# Patient Record
Sex: MALE | Race: WHITE | NOT HISPANIC OR LATINO | Employment: OTHER | URBAN - METROPOLITAN AREA
[De-identification: names, ages, dates, MRNs, and addresses within clinical notes are randomized per-mention and may not be internally consistent; named-entity substitution may affect disease eponyms.]

---

## 2023-04-20 DIAGNOSIS — E13.69 OTHER SPECIFIED DIABETES MELLITUS WITH OTHER SPECIFIED COMPLICATION, UNSPECIFIED WHETHER LONG TERM INSULIN USE (MULTI): ICD-10-CM

## 2023-04-20 RX ORDER — METOPROLOL SUCCINATE 50 MG/1
50 TABLET, EXTENDED RELEASE ORAL DAILY
Qty: 90 TABLET | Refills: 0 | Status: SHIPPED | OUTPATIENT
Start: 2023-04-20 | End: 2023-06-21

## 2023-04-20 RX ORDER — METOPROLOL SUCCINATE 50 MG/1
TABLET, EXTENDED RELEASE ORAL
COMMUNITY
End: 2023-04-20 | Stop reason: SDUPTHER

## 2023-06-07 PROBLEM — I10 BENIGN ESSENTIAL HYPERTENSION: Status: ACTIVE | Noted: 2023-06-07

## 2023-06-07 PROBLEM — I65.29 ARTERIOSCLEROSIS OF CAROTID ARTERY: Status: ACTIVE | Noted: 2023-06-07

## 2023-06-07 PROBLEM — N18.9 CKD (CHRONIC KIDNEY DISEASE): Status: ACTIVE | Noted: 2023-06-07

## 2023-06-07 PROBLEM — I83.891: Status: ACTIVE | Noted: 2023-06-07

## 2023-06-07 PROBLEM — E78.00 PURE HYPERCHOLESTEROLEMIA: Status: ACTIVE | Noted: 2023-06-07

## 2023-06-07 PROBLEM — Z86.73 H/O: CVA (CEREBROVASCULAR ACCIDENT): Status: ACTIVE | Noted: 2023-06-07

## 2023-06-07 PROBLEM — R89.9 ABNORMAL LABORATORY TEST RESULT: Status: ACTIVE | Noted: 2023-06-07

## 2023-06-07 PROBLEM — H53.9 VISUAL CHANGES: Status: ACTIVE | Noted: 2023-06-07

## 2023-06-07 PROBLEM — R93.1 ABNORMAL ECHOCARDIOGRAM: Status: ACTIVE | Noted: 2023-06-07

## 2023-06-07 PROBLEM — G45.0: Status: ACTIVE | Noted: 2023-06-07

## 2023-06-07 PROBLEM — Z95.2 S/P TAVR (TRANSCATHETER AORTIC VALVE REPLACEMENT): Status: ACTIVE | Noted: 2023-06-07

## 2023-06-07 PROBLEM — R26.81 GAIT INSTABILITY: Status: ACTIVE | Noted: 2023-06-07

## 2023-06-07 PROBLEM — M21.379 FOOT-DROP: Status: ACTIVE | Noted: 2023-06-07

## 2023-06-07 PROBLEM — Z95.1 S/P CABG (CORONARY ARTERY BYPASS GRAFT): Status: ACTIVE | Noted: 2023-06-07

## 2023-06-07 PROBLEM — R01.1 MURMUR: Status: ACTIVE | Noted: 2023-06-07

## 2023-06-07 PROBLEM — K21.9 GERD (GASTROESOPHAGEAL REFLUX DISEASE): Status: ACTIVE | Noted: 2023-06-07

## 2023-06-07 PROBLEM — I35.0 AORTIC STENOSIS: Status: ACTIVE | Noted: 2023-06-07

## 2023-06-07 PROBLEM — H91.90 HEARING LOSS: Status: ACTIVE | Noted: 2023-06-07

## 2023-06-07 PROBLEM — I25.10 3-VESSEL CORONARY ARTERY DISEASE: Status: ACTIVE | Noted: 2023-06-07

## 2023-06-07 PROBLEM — H26.9 CATARACT: Status: ACTIVE | Noted: 2023-06-07

## 2023-06-07 RX ORDER — NIACIN (INOSITOL NIACINATE) 400(500MG)
1 CAPSULE ORAL DAILY
COMMUNITY

## 2023-06-07 RX ORDER — ASPIRIN 81 MG/1
81 TABLET ORAL DAILY
COMMUNITY
Start: 2016-08-24

## 2023-06-07 RX ORDER — LANOLIN ALCOHOL/MO/W.PET/CERES
1000 CREAM (GRAM) TOPICAL DAILY
COMMUNITY

## 2023-06-07 RX ORDER — SIMVASTATIN 40 MG/1
TABLET, FILM COATED ORAL
COMMUNITY
Start: 2013-08-15 | End: 2024-01-05 | Stop reason: SDUPTHER

## 2023-06-07 RX ORDER — ACETAMINOPHEN 500 MG
1 TABLET ORAL DAILY
COMMUNITY

## 2023-06-07 RX ORDER — POLYETHYLENE GLYCOL 3350 17 G/17G
17 POWDER, FOR SOLUTION ORAL ONCE
COMMUNITY
Start: 2021-04-02

## 2023-06-19 ENCOUNTER — OFFICE VISIT (OUTPATIENT)
Dept: PRIMARY CARE | Facility: CLINIC | Age: 88
End: 2023-06-19
Payer: MEDICARE

## 2023-06-19 VITALS
HEIGHT: 66 IN | BODY MASS INDEX: 27.48 KG/M2 | DIASTOLIC BLOOD PRESSURE: 68 MMHG | WEIGHT: 171 LBS | SYSTOLIC BLOOD PRESSURE: 157 MMHG

## 2023-06-19 DIAGNOSIS — Z00.00 ROUTINE GENERAL MEDICAL EXAMINATION AT HEALTH CARE FACILITY: ICD-10-CM

## 2023-06-19 DIAGNOSIS — N18.31 STAGE 3A CHRONIC KIDNEY DISEASE (MULTI): ICD-10-CM

## 2023-06-19 DIAGNOSIS — R26.81 GAIT INSTABILITY: ICD-10-CM

## 2023-06-19 DIAGNOSIS — I10 BENIGN ESSENTIAL HYPERTENSION: ICD-10-CM

## 2023-06-19 DIAGNOSIS — Z95.2 S/P TAVR (TRANSCATHETER AORTIC VALVE REPLACEMENT): ICD-10-CM

## 2023-06-19 DIAGNOSIS — Z13.89 SCREENING FOR MULTIPLE CONDITIONS: ICD-10-CM

## 2023-06-19 DIAGNOSIS — Z95.1 S/P CABG (CORONARY ARTERY BYPASS GRAFT): ICD-10-CM

## 2023-06-19 DIAGNOSIS — I65.23 ARTERIOSCLEROSIS OF BOTH CAROTID ARTERIES: Primary | ICD-10-CM

## 2023-06-19 DIAGNOSIS — Z71.89 CARDIAC RISK COUNSELING: ICD-10-CM

## 2023-06-19 LAB
ALANINE AMINOTRANSFERASE (SGPT) (U/L) IN SER/PLAS: 6 U/L (ref 10–52)
ALBUMIN (G/DL) IN SER/PLAS: 4 G/DL (ref 3.4–5)
ALKALINE PHOSPHATASE (U/L) IN SER/PLAS: 77 U/L (ref 33–136)
ANION GAP IN SER/PLAS: 13 MMOL/L (ref 10–20)
ASPARTATE AMINOTRANSFERASE (SGOT) (U/L) IN SER/PLAS: 13 U/L (ref 9–39)
BILIRUBIN TOTAL (MG/DL) IN SER/PLAS: 0.6 MG/DL (ref 0–1.2)
CALCIUM (MG/DL) IN SER/PLAS: 10 MG/DL (ref 8.6–10.6)
CARBON DIOXIDE, TOTAL (MMOL/L) IN SER/PLAS: 25 MMOL/L (ref 21–32)
CHLORIDE (MMOL/L) IN SER/PLAS: 108 MMOL/L (ref 98–107)
CHOLESTEROL (MG/DL) IN SER/PLAS: 144 MG/DL (ref 0–199)
CHOLESTEROL IN HDL (MG/DL) IN SER/PLAS: 46.4 MG/DL
CHOLESTEROL/HDL RATIO: 3.1
CREATININE (MG/DL) IN SER/PLAS: 2.74 MG/DL (ref 0.5–1.3)
GFR MALE: 21 ML/MIN/1.73M2
GLUCOSE (MG/DL) IN SER/PLAS: 86 MG/DL (ref 74–99)
LDL: 83 MG/DL (ref 0–99)
POTASSIUM (MMOL/L) IN SER/PLAS: 4.7 MMOL/L (ref 3.5–5.3)
PROTEIN TOTAL: 6.7 G/DL (ref 6.4–8.2)
SODIUM (MMOL/L) IN SER/PLAS: 141 MMOL/L (ref 136–145)
TRIGLYCERIDE (MG/DL) IN SER/PLAS: 74 MG/DL (ref 0–149)
UREA NITROGEN (MG/DL) IN SER/PLAS: 50 MG/DL (ref 6–23)
VLDL: 15 MG/DL (ref 0–40)

## 2023-06-19 PROCEDURE — 80061 LIPID PANEL: CPT

## 2023-06-19 PROCEDURE — 99214 OFFICE O/P EST MOD 30 MIN: CPT | Performed by: FAMILY MEDICINE

## 2023-06-19 PROCEDURE — 3078F DIAST BP <80 MM HG: CPT | Performed by: FAMILY MEDICINE

## 2023-06-19 PROCEDURE — 3077F SYST BP >= 140 MM HG: CPT | Performed by: FAMILY MEDICINE

## 2023-06-19 PROCEDURE — G0180 MD CERTIFICATION HHA PATIENT: HCPCS | Performed by: FAMILY MEDICINE

## 2023-06-19 PROCEDURE — 1170F FXNL STATUS ASSESSED: CPT | Performed by: FAMILY MEDICINE

## 2023-06-19 PROCEDURE — 1160F RVW MEDS BY RX/DR IN RCRD: CPT | Performed by: FAMILY MEDICINE

## 2023-06-19 PROCEDURE — 1036F TOBACCO NON-USER: CPT | Performed by: FAMILY MEDICINE

## 2023-06-19 PROCEDURE — G0444 DEPRESSION SCREEN ANNUAL: HCPCS | Performed by: FAMILY MEDICINE

## 2023-06-19 PROCEDURE — 1159F MED LIST DOCD IN RCRD: CPT | Performed by: FAMILY MEDICINE

## 2023-06-19 PROCEDURE — 80053 COMPREHEN METABOLIC PANEL: CPT

## 2023-06-19 PROCEDURE — G0439 PPPS, SUBSEQ VISIT: HCPCS | Performed by: FAMILY MEDICINE

## 2023-06-19 PROCEDURE — 1157F ADVNC CARE PLAN IN RCRD: CPT | Performed by: FAMILY MEDICINE

## 2023-06-19 RX ORDER — CLOPIDOGREL BISULFATE 75 MG/1
TABLET ORAL
COMMUNITY
End: 2023-12-18 | Stop reason: ALTCHOICE

## 2023-06-19 RX ORDER — FAMOTIDINE 20 MG/1
1 TABLET, FILM COATED ORAL DAILY
COMMUNITY

## 2023-06-19 RX ORDER — AMLODIPINE BESYLATE 5 MG/1
5 TABLET ORAL DAILY
Qty: 90 TABLET | Refills: 1 | Status: SHIPPED | OUTPATIENT
Start: 2023-06-19 | End: 2023-07-27 | Stop reason: SDUPTHER

## 2023-06-19 RX ORDER — METOPROLOL TARTRATE 25 MG/1
TABLET, FILM COATED ORAL
COMMUNITY
End: 2023-12-18 | Stop reason: ALTCHOICE

## 2023-06-19 RX ORDER — PREDNISOLONE ACETATE 10 MG/ML
SUSPENSION/ DROPS OPHTHALMIC
COMMUNITY
End: 2023-06-19 | Stop reason: ALTCHOICE

## 2023-06-19 ASSESSMENT — ENCOUNTER SYMPTOMS
LOSS OF SENSATION IN FEET: 0
DEPRESSION: 0
OCCASIONAL FEELINGS OF UNSTEADINESS: 1

## 2023-06-19 ASSESSMENT — ACTIVITIES OF DAILY LIVING (ADL)
TAKING_MEDICATION: NEEDS ASSISTANCE
GROCERY_SHOPPING: NEEDS ASSISTANCE
BATHING: INDEPENDENT
DOING_HOUSEWORK: NEEDS ASSISTANCE
MANAGING_FINANCES: NEEDS ASSISTANCE
DRESSING: INDEPENDENT

## 2023-06-19 ASSESSMENT — PATIENT HEALTH QUESTIONNAIRE - PHQ9
1. LITTLE INTEREST OR PLEASURE IN DOING THINGS: NOT AT ALL
SUM OF ALL RESPONSES TO PHQ9 QUESTIONS 1 AND 2: 0
2. FEELING DOWN, DEPRESSED OR HOPELESS: NOT AT ALL

## 2023-06-21 DIAGNOSIS — E13.69 OTHER SPECIFIED DIABETES MELLITUS WITH OTHER SPECIFIED COMPLICATION, UNSPECIFIED WHETHER LONG TERM INSULIN USE (MULTI): ICD-10-CM

## 2023-06-21 RX ORDER — METOPROLOL SUCCINATE 50 MG/1
50 TABLET, EXTENDED RELEASE ORAL DAILY
Qty: 90 TABLET | Refills: 1 | Status: SHIPPED | OUTPATIENT
Start: 2023-06-21 | End: 2023-07-27 | Stop reason: SDUPTHER

## 2023-06-24 PROBLEM — Z13.89 SCREENING FOR MULTIPLE CONDITIONS: Status: ACTIVE | Noted: 2023-06-24

## 2023-06-24 PROBLEM — Z71.89 CARDIAC RISK COUNSELING: Status: ACTIVE | Noted: 2023-06-24

## 2023-06-24 PROBLEM — Z00.00 ROUTINE GENERAL MEDICAL EXAMINATION AT HEALTH CARE FACILITY: Status: ACTIVE | Noted: 2023-06-24

## 2023-06-24 ASSESSMENT — ENCOUNTER SYMPTOMS
ACTIVITY CHANGE: 1
GASTROINTESTINAL NEGATIVE: 1
PSYCHIATRIC NEGATIVE: 1
FATIGUE: 1
RESPIRATORY NEGATIVE: 1
BACK PAIN: 1
MYALGIAS: 1
APPETITE CHANGE: 1
JOINT SWELLING: 1
ARTHRALGIAS: 1
UNEXPECTED WEIGHT CHANGE: 1

## 2023-06-24 NOTE — PROGRESS NOTES
"Subjective   Reason for Visit: Edward Johnson is an 89 y.o. male here for a Medicare Wellness visit.     Past Medical, Surgical, and Family History reviewed and updated in chart.    Reviewed all medications by prescribing practitioner or clinical pharmacist (such as prescriptions, OTCs, herbal therapies and supplements) and documented in the medical record.    Gait Problem  This is a recurrent problem. The current episode started more than 1 month ago. The problem occurs constantly. The problem has been gradually worsening. Associated symptoms include arthralgias, fatigue, joint swelling and myalgias. Nothing aggravates the symptoms. He has tried nothing for the symptoms.   Patient has been getting weaker and has a very hard time getting out of a chairHas not had any falls has 3 steps to get into the house and is very hard getting in lives alone  History of heart disease taking meds as prescribed denies complaints  Blood pressures controlled    Patient Care Team:  Reese Nielsen MD as PCP - General     Review of Systems   Constitutional:  Positive for activity change, appetite change, fatigue and unexpected weight change.   HENT:  Positive for hearing loss.    Eyes:  Positive for visual disturbance.   Respiratory: Negative.     Cardiovascular:  Positive for leg swelling.   Gastrointestinal: Negative.    Musculoskeletal:  Positive for arthralgias, back pain, gait problem, joint swelling and myalgias.   Psychiatric/Behavioral: Negative.         Objective   Vitals:  Blood Pressure 157/68   Height 1.676 m (5' 6\")   Weight 77.6 kg (171 lb)   Body Mass Index 27.60 kg/m²       Physical Exam    Assessment/Plan   Problem List Items Addressed This Visit       Arteriosclerosis of carotid artery - Primary    Current Assessment & Plan     Continue statin and aspirin         Relevant Orders    Comprehensive Metabolic Panel (Completed)    Lipid Panel (Completed)    Benign essential hypertension    Current Assessment & Plan     " "Blood pressure is elevated will add amlodipine 5 mg daily monitor blood pressure with home health         Relevant Medications    amLODIPine (Norvasc) 5 mg tablet    Other Relevant Orders    Comprehensive Metabolic Panel (Completed)    Lipid Panel (Completed)    CKD (chronic kidney disease)    Current Assessment & Plan     Will monitor kidney function avoid nephrotoxic agents         Gait instability    Current Assessment & Plan     Refer to home health for OT PT to improve gait did not function         Relevant Orders    Referral to Home Care    S/P TAVR (transcatheter aortic valve replacement)    S/P CABG (coronary artery bypass graft)    Screening for multiple conditions    Cardiac risk counseling    Routine general medical examination at health care facility    spent 15 minutes obtaining and discussing depression screening using PHQ-2 questions with results documented in chart.”  (If screen positive: \"The screen indicated potential depression and PHQ-9 was obtained with treatment and referral plan discussed         "

## 2023-07-13 ENCOUNTER — TELEPHONE (OUTPATIENT)
Dept: PRIMARY CARE | Facility: CLINIC | Age: 88
End: 2023-07-13

## 2023-07-13 NOTE — TELEPHONE ENCOUNTER
A home care nurse with  called and stated Sage's BP has still been elevated at 170/80 after a week of taking the new BP med prescribed. The nurse was seeing if anything should be adjusted with the new medication prescribed. The nurse michell she stated if med changes are needed to send in new orders as she will be on vacation for the next two weeks.

## 2023-07-26 ENCOUNTER — TELEPHONE (OUTPATIENT)
Dept: PRIMARY CARE | Facility: CLINIC | Age: 88
End: 2023-07-26

## 2023-07-27 DIAGNOSIS — E13.69 OTHER SPECIFIED DIABETES MELLITUS WITH OTHER SPECIFIED COMPLICATION, UNSPECIFIED WHETHER LONG TERM INSULIN USE (MULTI): ICD-10-CM

## 2023-07-27 DIAGNOSIS — I10 BENIGN ESSENTIAL HYPERTENSION: ICD-10-CM

## 2023-07-27 RX ORDER — AMLODIPINE BESYLATE 5 MG/1
5 TABLET ORAL DAILY
Qty: 90 TABLET | Refills: 1 | Status: SHIPPED | OUTPATIENT
Start: 2023-07-27 | End: 2023-08-04 | Stop reason: ALTCHOICE

## 2023-07-27 RX ORDER — METOPROLOL SUCCINATE 50 MG/1
50 TABLET, EXTENDED RELEASE ORAL DAILY
Qty: 90 TABLET | Refills: 1 | Status: SHIPPED | OUTPATIENT
Start: 2023-07-27 | End: 2023-10-04

## 2023-08-03 ENCOUNTER — TELEPHONE (OUTPATIENT)
Dept: PRIMARY CARE | Facility: CLINIC | Age: 88
End: 2023-08-03

## 2023-08-03 DIAGNOSIS — I10 BENIGN ESSENTIAL HYPERTENSION: ICD-10-CM

## 2023-08-03 NOTE — TELEPHONE ENCOUNTER
Faviola [phone] with home care says pt bp has been in the 170's/69 for the past few weeks. Nurse says she can not discharge her with an elevated bp.   Please advise?

## 2023-08-04 RX ORDER — AMLODIPINE BESYLATE 10 MG/1
10 TABLET ORAL DAILY
Qty: 90 TABLET | Refills: 1 | Status: SHIPPED | OUTPATIENT
Start: 2023-08-04 | End: 2023-12-18 | Stop reason: SDUPTHER

## 2023-10-04 DIAGNOSIS — E13.69 OTHER SPECIFIED DIABETES MELLITUS WITH OTHER SPECIFIED COMPLICATION, UNSPECIFIED WHETHER LONG TERM INSULIN USE (MULTI): ICD-10-CM

## 2023-10-04 RX ORDER — METOPROLOL SUCCINATE 50 MG/1
50 TABLET, EXTENDED RELEASE ORAL DAILY
Qty: 90 TABLET | Refills: 1 | Status: SHIPPED | OUTPATIENT
Start: 2023-10-04 | End: 2023-12-18 | Stop reason: SDUPTHER

## 2023-12-18 ENCOUNTER — OFFICE VISIT (OUTPATIENT)
Dept: PRIMARY CARE | Facility: CLINIC | Age: 88
End: 2023-12-18
Payer: MEDICARE

## 2023-12-18 VITALS
BODY MASS INDEX: 27.99 KG/M2 | SYSTOLIC BLOOD PRESSURE: 132 MMHG | DIASTOLIC BLOOD PRESSURE: 83 MMHG | HEART RATE: 69 BPM | HEIGHT: 65 IN | WEIGHT: 168 LBS

## 2023-12-18 DIAGNOSIS — E13.69 OTHER SPECIFIED DIABETES MELLITUS WITH OTHER SPECIFIED COMPLICATION, UNSPECIFIED WHETHER LONG TERM INSULIN USE (MULTI): ICD-10-CM

## 2023-12-18 DIAGNOSIS — I10 BENIGN ESSENTIAL HYPERTENSION: ICD-10-CM

## 2023-12-18 DIAGNOSIS — Z86.73 H/O: CVA (CEREBROVASCULAR ACCIDENT): ICD-10-CM

## 2023-12-18 DIAGNOSIS — Z00.00 ROUTINE GENERAL MEDICAL EXAMINATION AT A HEALTH CARE FACILITY: Primary | ICD-10-CM

## 2023-12-18 DIAGNOSIS — N18.4 CHRONIC RENAL DISEASE, STAGE IV (MULTI): ICD-10-CM

## 2023-12-18 DIAGNOSIS — Z95.1 S/P CABG (CORONARY ARTERY BYPASS GRAFT): ICD-10-CM

## 2023-12-18 DIAGNOSIS — I50.32 CHRONIC DIASTOLIC CONGESTIVE HEART FAILURE (MULTI): ICD-10-CM

## 2023-12-18 PROBLEM — R14.2 BELCHING: Status: ACTIVE | Noted: 2023-12-18

## 2023-12-18 PROBLEM — I50.9 CONGESTIVE HEART FAILURE (MULTI): Status: ACTIVE | Noted: 2023-12-18

## 2023-12-18 PROBLEM — R54 AGE-RELATED PHYSICAL DEBILITY: Status: ACTIVE | Noted: 2023-12-18

## 2023-12-18 PROBLEM — I63.9 CEREBRAL INFARCTION (MULTI): Status: ACTIVE | Noted: 2023-12-18

## 2023-12-18 PROBLEM — H61.23 HEARING LOSS DUE TO CERUMEN IMPACTION, BILATERAL: Status: ACTIVE | Noted: 2023-12-18

## 2023-12-18 PROBLEM — S46.219A BICEPS TENDON RUPTURE: Status: ACTIVE | Noted: 2023-12-18

## 2023-12-18 PROBLEM — D12.6 BENIGN NEOPLASM OF COLON: Status: ACTIVE | Noted: 2023-12-18

## 2023-12-18 PROBLEM — M20.40 HAMMER TOE: Status: RESOLVED | Noted: 2023-12-18 | Resolved: 2023-12-18

## 2023-12-18 PROBLEM — R22.40 LEG MASS: Status: ACTIVE | Noted: 2023-12-18

## 2023-12-18 PROBLEM — L03.115 CELLULITIS OF RIGHT LOWER EXTREMITY: Status: ACTIVE | Noted: 2023-12-18

## 2023-12-18 PROBLEM — R54 FRAILTY: Status: ACTIVE | Noted: 2023-12-18

## 2023-12-18 LAB
ALBUMIN SERPL BCP-MCNC: 4.2 G/DL (ref 3.4–5)
ANION GAP SERPL CALC-SCNC: 15 MMOL/L (ref 10–20)
BUN SERPL-MCNC: 54 MG/DL (ref 6–23)
CALCIUM SERPL-MCNC: 9.8 MG/DL (ref 8.6–10.6)
CHLORIDE SERPL-SCNC: 105 MMOL/L (ref 98–107)
CO2 SERPL-SCNC: 24 MMOL/L (ref 21–32)
CREAT SERPL-MCNC: 3.36 MG/DL (ref 0.5–1.3)
ERYTHROCYTE [DISTWIDTH] IN BLOOD BY AUTOMATED COUNT: 13.3 % (ref 11.5–14.5)
GFR SERPL CREATININE-BSD FRML MDRD: 17 ML/MIN/1.73M*2
GLUCOSE SERPL-MCNC: 78 MG/DL (ref 74–99)
HCT VFR BLD AUTO: 39.4 % (ref 41–52)
HGB BLD-MCNC: 12.5 G/DL (ref 13.5–17.5)
MCH RBC QN AUTO: 30 PG (ref 26–34)
MCHC RBC AUTO-ENTMCNC: 31.7 G/DL (ref 32–36)
MCV RBC AUTO: 95 FL (ref 80–100)
NRBC BLD-RTO: 0 /100 WBCS (ref 0–0)
PHOSPHATE SERPL-MCNC: 4 MG/DL (ref 2.5–4.9)
PLATELET # BLD AUTO: 188 X10*3/UL (ref 150–450)
POTASSIUM SERPL-SCNC: 4.8 MMOL/L (ref 3.5–5.3)
RBC # BLD AUTO: 4.17 X10*6/UL (ref 4.5–5.9)
SODIUM SERPL-SCNC: 139 MMOL/L (ref 136–145)
WBC # BLD AUTO: 6.9 X10*3/UL (ref 4.4–11.3)

## 2023-12-18 PROCEDURE — 1036F TOBACCO NON-USER: CPT | Performed by: FAMILY MEDICINE

## 2023-12-18 PROCEDURE — G0180 MD CERTIFICATION HHA PATIENT: HCPCS | Performed by: FAMILY MEDICINE

## 2023-12-18 PROCEDURE — 36415 COLL VENOUS BLD VENIPUNCTURE: CPT

## 2023-12-18 PROCEDURE — 3079F DIAST BP 80-89 MM HG: CPT | Performed by: FAMILY MEDICINE

## 2023-12-18 PROCEDURE — 80069 RENAL FUNCTION PANEL: CPT

## 2023-12-18 PROCEDURE — 99397 PER PM REEVAL EST PAT 65+ YR: CPT | Performed by: FAMILY MEDICINE

## 2023-12-18 PROCEDURE — 99214 OFFICE O/P EST MOD 30 MIN: CPT | Performed by: FAMILY MEDICINE

## 2023-12-18 PROCEDURE — 1160F RVW MEDS BY RX/DR IN RCRD: CPT | Performed by: FAMILY MEDICINE

## 2023-12-18 PROCEDURE — 3075F SYST BP GE 130 - 139MM HG: CPT | Performed by: FAMILY MEDICINE

## 2023-12-18 PROCEDURE — 85027 COMPLETE CBC AUTOMATED: CPT

## 2023-12-18 PROCEDURE — 1159F MED LIST DOCD IN RCRD: CPT | Performed by: FAMILY MEDICINE

## 2023-12-18 RX ORDER — METOPROLOL SUCCINATE 50 MG/1
50 TABLET, EXTENDED RELEASE ORAL DAILY
Qty: 90 TABLET | Refills: 1 | Status: SHIPPED | OUTPATIENT
Start: 2023-12-18 | End: 2024-02-25

## 2023-12-18 RX ORDER — AMLODIPINE BESYLATE 10 MG/1
10 TABLET ORAL DAILY
Qty: 90 TABLET | Refills: 1 | Status: SHIPPED | OUTPATIENT
Start: 2023-12-18 | End: 2024-02-25

## 2023-12-18 NOTE — PROGRESS NOTES
"Subjective   Patient ID: Edward Johnson is a 89 y.o. male who presents for Follow-up and Fall (Possible do therapy again).      HPI home health requested   Very weak has fallen few shaw  BP much better RF stable.   Renal function stable  Edema improved.   Mentaion progressively worse but manages ADLs  Current Outpatient Medications on File Prior to Visit   Medication Sig Dispense Refill    aspirin 81 mg EC tablet TAKE 1 TABLET DAILY.      cholecalciferol (Vitamin D-3) 50 mcg (2,000 unit) capsule TAKE 1 CAPSULE Daily      coenzyme Q10-vitamin E 100-5 mg-unit capsule TAKE 1 CAPSULE Daily      cyanocobalamin (Vitamin B-12) 1,000 mcg tablet TAKE 1 TABLET DAILY AS DIRECTED.      famotidine (Pepcid) 20 mg tablet famotidine 20 mg tablet   TAKE 1 TABLET BY MOUTH EVERY DAY      polyethylene glycol (Miralax) 17 gram/dose powder MIX 1 CAPFUL (17GM) IN 8 OUNCES OF WATER, JUICE, OR TEA AND DRINK DAILY.      simvastatin (Zocor) 40 mg tablet Take 1 tablet by mouth  daily as directed       No current facility-administered medications on file prior to visit.        Review of Systems   Constitutional:  Positive for activity change and fatigue.   HENT:  Positive for hearing loss. Negative for dental problem.    Eyes:  Positive for visual disturbance.   Respiratory: Negative.     Cardiovascular:  Positive for leg swelling.   Gastrointestinal: Negative.    Musculoskeletal:  Positive for arthralgias, back pain, gait problem, joint swelling and myalgias.   Neurological:  Negative for dizziness and facial asymmetry.   Psychiatric/Behavioral:  Positive for confusion.        Objective   Blood Pressure 132/83   Pulse 69   Height 1.651 m (5' 5\")   Weight 76.2 kg (168 lb)   Body Mass Index 27.96 kg/m²   BSA: 1.87 meters squared  Growth percentiles: Facility age limit for growth %zeke is 20 years. Facility age limit for growth %zeke is 20 years.     Physical Exam  Constitutional:       General: He is not in acute distress.  HENT:      Head: " Normocephalic and atraumatic.   Eyes:      Pupils: Pupils are equal, round, and reactive to light.   Cardiovascular:      Rate and Rhythm: Normal rate and regular rhythm.      Pulses: Normal pulses.      Heart sounds: Normal heart sounds.   Abdominal:      General: Abdomen is flat.   Musculoskeletal:         General: Tenderness present.      Right lower leg: Edema present.      Left lower leg: Edema present.      Comments: Impaired gait.    Neurological:      Mental Status: He is alert. He is disoriented.         Assessment/Plan   Problem List Items Addressed This Visit             ICD-10-CM    Benign essential hypertension I10    Relevant Medications    amLODIPine (Norvasc) 10 mg tablet    Other Relevant Orders    CBC (Completed)    Renal Function Panel (Completed)    H/O: CVA (cerebrovascular accident) Z86.73    Relevant Orders    Referral to Home Care    S/P CABG (coronary artery bypass graft) Z95.1    Congestive heart failure (CMS/[facility]) I50.9     Will monitor BP limt sodium.          Relevant Medications    amLODIPine (Norvasc) 10 mg tablet    metoprolol succinate XL (Toprol-XL) 50 mg 24 hr tablet    Other Relevant Orders    Referral to Home Care    Chronic renal disease, stage IV (CMS/[facility]) N18.4     Renal function stable .          Relevant Orders    Referral to Home Care    Other specified diabetes mellitus with other specified complication (CMS/[facility]) E13.69    Relevant Medications    metoprolol succinate XL (Toprol-XL) 50 mg 24 hr tablet    Routine general medical examination at a health care facility - Primary Z00.00

## 2023-12-30 PROBLEM — N18.4 CHRONIC RENAL DISEASE, STAGE IV (MULTI): Status: ACTIVE | Noted: 2023-12-30

## 2023-12-30 PROBLEM — E13.69 OTHER SPECIFIED DIABETES MELLITUS WITH OTHER SPECIFIED COMPLICATION (MULTI): Status: ACTIVE | Noted: 2023-12-30

## 2023-12-30 ASSESSMENT — ENCOUNTER SYMPTOMS
JOINT SWELLING: 1
FACIAL ASYMMETRY: 0
DIZZINESS: 0
CONFUSION: 1
ACTIVITY CHANGE: 1
GASTROINTESTINAL NEGATIVE: 1
RESPIRATORY NEGATIVE: 1
FATIGUE: 1
ARTHRALGIAS: 1
BACK PAIN: 1
MYALGIAS: 1

## 2023-12-31 ENCOUNTER — HOME HEALTH ADMISSION (OUTPATIENT)
Dept: HOME HEALTH SERVICES | Facility: HOME HEALTH | Age: 88
End: 2023-12-31
Payer: MEDICARE

## 2023-12-31 ENCOUNTER — DOCUMENTATION (OUTPATIENT)
Dept: HOME HEALTH SERVICES | Facility: HOME HEALTH | Age: 88
End: 2023-12-31

## 2023-12-31 PROBLEM — Z00.00 ROUTINE GENERAL MEDICAL EXAMINATION AT A HEALTH CARE FACILITY: Status: ACTIVE | Noted: 2023-12-31

## 2023-12-31 NOTE — HH CARE COORDINATION
Home Care received a Referral for Nursing, Physical Therapy, and Occupational Therapy. We have processed the referral for a Start of Care on 01-03-24.     If you have any questions or concerns, please feel free to contact us at [phone]. Follow the prompts, enter your five digit zip code, and you will be directed to your care team on [facility].

## 2024-01-03 ENCOUNTER — HOME CARE VISIT (OUTPATIENT)
Dept: HOME HEALTH SERVICES | Facility: HOME HEALTH | Age: 89
End: 2024-01-03
Payer: MEDICARE

## 2024-01-03 PROCEDURE — G0299 HHS/HOSPICE OF RN EA 15 MIN: HCPCS

## 2024-01-03 PROCEDURE — 0023 HH SOC

## 2024-01-05 ENCOUNTER — HOME CARE VISIT (OUTPATIENT)
Dept: HOME HEALTH SERVICES | Facility: HOME HEALTH | Age: 89
End: 2024-01-05
Payer: MEDICARE

## 2024-01-05 DIAGNOSIS — E78.00 PURE HYPERCHOLESTEROLEMIA: ICD-10-CM

## 2024-01-05 PROCEDURE — G0151 HHCP-SERV OF PT,EA 15 MIN: HCPCS

## 2024-01-05 RX ORDER — SIMVASTATIN 40 MG/1
40 TABLET, FILM COATED ORAL DAILY
Qty: 90 TABLET | Refills: 1 | Status: SHIPPED | OUTPATIENT
Start: 2024-01-05 | End: 2024-03-11

## 2024-01-05 SDOH — ECONOMIC STABILITY: HOUSING INSECURITY: HOME SAFETY: FALL RISK

## 2024-01-05 ASSESSMENT — ENCOUNTER SYMPTOMS
PAIN: 1
PERSON REPORTING PAIN: PATIENT

## 2024-01-06 ENCOUNTER — HOME CARE VISIT (OUTPATIENT)
Dept: HOME HEALTH SERVICES | Facility: HOME HEALTH | Age: 89
End: 2024-01-06
Payer: MEDICARE

## 2024-01-06 VITALS
DIASTOLIC BLOOD PRESSURE: 69 MMHG | HEART RATE: 74 BPM | SYSTOLIC BLOOD PRESSURE: 144 MMHG | OXYGEN SATURATION: 98 % | RESPIRATION RATE: 18 BRPM

## 2024-01-06 ASSESSMENT — ACTIVITIES OF DAILY LIVING (ADL)
DRESSING_LB_CURRENT_FUNCTION: STAND BY ASSIST
DRESSING_UB_CURRENT_FUNCTION: STAND BY ASSIST
BATHING_CURRENT_FUNCTION: STAND BY ASSIST
BATHING ASSESSED: 1
ENTERING_EXITING_HOME: INDEPENDENT
TOILETING: STAND BY ASSIST
AMBULATION_DISTANCE/DURATION_TOLERATED: 20
AMBULATION ASSISTANCE: 1
TOILETING: 1
AMBULATION ASSISTANCE: STAND BY ASSIST
OASIS_M1830: 05

## 2024-01-06 ASSESSMENT — ENCOUNTER SYMPTOMS
OCCASIONAL FEELINGS OF UNSTEADINESS: 0
PAIN: 1
HIGHEST PAIN SEVERITY IN PAST 24 HOURS: 0/10
APPETITE LEVEL: GOOD
MUSCLE WEAKNESS: 1

## 2024-01-09 ENCOUNTER — HOME CARE VISIT (OUTPATIENT)
Dept: HOME HEALTH SERVICES | Facility: HOME HEALTH | Age: 89
End: 2024-01-09
Payer: MEDICARE

## 2024-01-09 PROCEDURE — G0157 HHC PT ASSISTANT EA 15: HCPCS

## 2024-01-10 ENCOUNTER — HOME CARE VISIT (OUTPATIENT)
Dept: HOME HEALTH SERVICES | Facility: HOME HEALTH | Age: 89
End: 2024-01-10
Payer: MEDICARE

## 2024-01-10 PROCEDURE — G0299 HHS/HOSPICE OF RN EA 15 MIN: HCPCS

## 2024-01-11 ENCOUNTER — HOME CARE VISIT (OUTPATIENT)
Dept: HOME HEALTH SERVICES | Facility: HOME HEALTH | Age: 89
End: 2024-01-11
Payer: MEDICARE

## 2024-01-11 PROCEDURE — G0157 HHC PT ASSISTANT EA 15: HCPCS

## 2024-01-12 VITALS
RESPIRATION RATE: 16 BRPM | OXYGEN SATURATION: 97 % | TEMPERATURE: 97.7 F | HEART RATE: 62 BPM | SYSTOLIC BLOOD PRESSURE: 117 MMHG | DIASTOLIC BLOOD PRESSURE: 74 MMHG

## 2024-01-12 ASSESSMENT — PAIN SCALES - PAIN ASSESSMENT IN ADVANCED DEMENTIA (PAINAD)
NEGVOCALIZATION: 0 - NONE.
FACIALEXPRESSION: 0 - SMILING OR INEXPRESSIVE.
NEGVOCALIZATION: 0
BREATHING: 0
BODYLANGUAGE: 0
FACIALEXPRESSION: 0
BODYLANGUAGE: 0 - RELAXED.

## 2024-01-12 ASSESSMENT — ENCOUNTER SYMPTOMS
OCCASIONAL FEELINGS OF UNSTEADINESS: 1
DEPRESSION: 0
LOSS OF SENSATION IN FEET: 0
APPETITE LEVEL: GOOD

## 2024-01-12 ASSESSMENT — ACTIVITIES OF DAILY LIVING (ADL)
DRESSING_LB_CURRENT_FUNCTION: STAND BY ASSIST
TOILETING: 1
CURRENT_FUNCTION: STAND BY ASSIST
TOILETING: STAND BY ASSIST
AMBULATION ASSISTANCE: STAND BY ASSIST
AMBULATION ASSISTANCE: 1
PHYSICAL TRANSFERS ASSESSED: 1
DRESSING_UB_CURRENT_FUNCTION: STAND BY ASSIST

## 2024-01-16 ENCOUNTER — HOME CARE VISIT (OUTPATIENT)
Dept: HOME HEALTH SERVICES | Facility: HOME HEALTH | Age: 89
End: 2024-01-16
Payer: MEDICARE

## 2024-01-16 VITALS — DIASTOLIC BLOOD PRESSURE: 68 MMHG | SYSTOLIC BLOOD PRESSURE: 164 MMHG | HEART RATE: 70 BPM

## 2024-01-16 PROCEDURE — G0157 HHC PT ASSISTANT EA 15: HCPCS

## 2024-01-16 PROCEDURE — G0180 MD CERTIFICATION HHA PATIENT: HCPCS | Performed by: FAMILY MEDICINE

## 2024-01-17 ENCOUNTER — HOME CARE VISIT (OUTPATIENT)
Dept: HOME HEALTH SERVICES | Facility: HOME HEALTH | Age: 89
End: 2024-01-17
Payer: MEDICARE

## 2024-01-17 PROCEDURE — G0299 HHS/HOSPICE OF RN EA 15 MIN: HCPCS

## 2024-01-18 ENCOUNTER — HOME CARE VISIT (OUTPATIENT)
Dept: HOME HEALTH SERVICES | Facility: HOME HEALTH | Age: 89
End: 2024-01-18
Payer: MEDICARE

## 2024-01-18 PROCEDURE — G0157 HHC PT ASSISTANT EA 15: HCPCS

## 2024-01-19 VITALS
RESPIRATION RATE: 16 BRPM | DIASTOLIC BLOOD PRESSURE: 78 MMHG | HEART RATE: 60 BPM | SYSTOLIC BLOOD PRESSURE: 148 MMHG | TEMPERATURE: 97.6 F | OXYGEN SATURATION: 97 %

## 2024-01-19 ASSESSMENT — ACTIVITIES OF DAILY LIVING (ADL)
TOILETING: 1
BATHING_CURRENT_FUNCTION: STAND BY ASSIST
DRESSING_UB_CURRENT_FUNCTION: STAND BY ASSIST
BATHING ASSESSED: 1
AMBULATION ASSISTANCE: 1
TOILETING: STAND BY ASSIST
DRESSING_LB_CURRENT_FUNCTION: STAND BY ASSIST
AMBULATION ASSISTANCE: STAND BY ASSIST

## 2024-01-19 ASSESSMENT — ENCOUNTER SYMPTOMS
LOWER EXTREMITY EDEMA: 1
APPETITE LEVEL: GOOD
DENIES PAIN: 1
PERSON REPORTING PAIN: PATIENT

## 2024-01-23 ENCOUNTER — HOME CARE VISIT (OUTPATIENT)
Dept: HOME HEALTH SERVICES | Facility: HOME HEALTH | Age: 89
End: 2024-01-23
Payer: MEDICARE

## 2024-01-23 PROCEDURE — G0157 HHC PT ASSISTANT EA 15: HCPCS

## 2024-01-25 ENCOUNTER — HOME CARE VISIT (OUTPATIENT)
Dept: HOME HEALTH SERVICES | Facility: HOME HEALTH | Age: 89
End: 2024-01-25
Payer: MEDICARE

## 2024-01-25 VITALS — HEART RATE: 69 BPM | SYSTOLIC BLOOD PRESSURE: 151 MMHG | DIASTOLIC BLOOD PRESSURE: 65 MMHG

## 2024-01-25 PROCEDURE — G0157 HHC PT ASSISTANT EA 15: HCPCS

## 2024-01-29 ENCOUNTER — HOME CARE VISIT (OUTPATIENT)
Dept: HOME HEALTH SERVICES | Facility: HOME HEALTH | Age: 89
End: 2024-01-29
Payer: MEDICARE

## 2024-01-29 VITALS — DIASTOLIC BLOOD PRESSURE: 85 MMHG | HEART RATE: 63 BPM | SYSTOLIC BLOOD PRESSURE: 135 MMHG

## 2024-01-29 PROCEDURE — G0157 HHC PT ASSISTANT EA 15: HCPCS

## 2024-02-01 ENCOUNTER — HOME CARE VISIT (OUTPATIENT)
Dept: HOME HEALTH SERVICES | Facility: HOME HEALTH | Age: 89
End: 2024-02-01
Payer: MEDICARE

## 2024-02-01 PROCEDURE — G0151 HHCP-SERV OF PT,EA 15 MIN: HCPCS

## 2024-02-01 ASSESSMENT — ACTIVITIES OF DAILY LIVING (ADL)
OASIS_M1830: 01
HOME_HEALTH_OASIS: 00

## 2024-02-01 ASSESSMENT — ENCOUNTER SYMPTOMS: OCCASIONAL FEELINGS OF UNSTEADINESS: 1

## 2024-02-25 DIAGNOSIS — E13.69 OTHER SPECIFIED DIABETES MELLITUS WITH OTHER SPECIFIED COMPLICATION, UNSPECIFIED WHETHER LONG TERM INSULIN USE (MULTI): ICD-10-CM

## 2024-02-25 DIAGNOSIS — I10 BENIGN ESSENTIAL HYPERTENSION: ICD-10-CM

## 2024-02-25 RX ORDER — METOPROLOL SUCCINATE 50 MG/1
50 TABLET, EXTENDED RELEASE ORAL DAILY
Qty: 100 TABLET | Refills: 1 | Status: SHIPPED | OUTPATIENT
Start: 2024-02-25 | End: 2024-03-08 | Stop reason: SDUPTHER

## 2024-02-25 RX ORDER — AMLODIPINE BESYLATE 10 MG/1
10 TABLET ORAL DAILY
Qty: 100 TABLET | Refills: 1 | Status: SHIPPED | OUTPATIENT
Start: 2024-02-25 | End: 2024-03-08 | Stop reason: SDUPTHER

## 2024-03-08 DIAGNOSIS — I10 BENIGN ESSENTIAL HYPERTENSION: ICD-10-CM

## 2024-03-08 DIAGNOSIS — E13.69 OTHER SPECIFIED DIABETES MELLITUS WITH OTHER SPECIFIED COMPLICATION, UNSPECIFIED WHETHER LONG TERM INSULIN USE (MULTI): ICD-10-CM

## 2024-03-08 RX ORDER — METOPROLOL SUCCINATE 50 MG/1
50 TABLET, EXTENDED RELEASE ORAL DAILY
Qty: 100 TABLET | Refills: 1 | Status: SHIPPED | OUTPATIENT
Start: 2024-03-08 | End: 2024-05-16

## 2024-03-08 RX ORDER — AMLODIPINE BESYLATE 10 MG/1
10 TABLET ORAL DAILY
Qty: 100 TABLET | Refills: 1 | Status: SHIPPED | OUTPATIENT
Start: 2024-03-08 | End: 2024-05-16

## 2024-03-10 DIAGNOSIS — E78.00 PURE HYPERCHOLESTEROLEMIA: ICD-10-CM

## 2024-03-11 RX ORDER — SIMVASTATIN 40 MG/1
40 TABLET, FILM COATED ORAL DAILY
Qty: 100 TABLET | Refills: 2 | Status: SHIPPED | OUTPATIENT
Start: 2024-03-11 | End: 2024-05-16

## 2024-05-10 ENCOUNTER — APPOINTMENT (OUTPATIENT)
Dept: RADIOLOGY | Facility: HOSPITAL | Age: 89
DRG: 811 | End: 2024-05-10
Payer: OTHER MISCELLANEOUS

## 2024-05-10 ENCOUNTER — HOSPITAL ENCOUNTER (INPATIENT)
Facility: HOSPITAL | Age: 89
LOS: 3 days | Discharge: HOSPICE | DRG: 811 | End: 2024-05-13
Attending: INTERNAL MEDICINE | Admitting: INTERNAL MEDICINE
Payer: OTHER MISCELLANEOUS

## 2024-05-10 DIAGNOSIS — D50.0 BLOOD LOSS ANEMIA: ICD-10-CM
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
## 2024-05-10 DIAGNOSIS — I50.43 CHF (CONGESTIVE HEART FAILURE), NYHA CLASS I, ACUTE ON CHRONIC, COMBINED (MULTI): ICD-10-CM

## 2024-05-10 DIAGNOSIS — Z51.5 ENCOUNTER FOR PALLIATIVE CARE: ICD-10-CM

## 2024-05-10 DIAGNOSIS — S70.01XA HEMATOMA OF RIGHT HIP, INITIAL ENCOUNTER: Primary | ICD-10-CM

## 2024-05-10 DIAGNOSIS — I50.9 ACUTE CONGESTIVE HEART FAILURE, UNSPECIFIED HEART FAILURE TYPE (MULTI): ICD-10-CM

## 2024-05-10 LAB
ALBUMIN SERPL BCP-MCNC: 3.6 G/DL (ref 3.4–5)
ALP SERPL-CCNC: 66 U/L (ref 33–136)
ALT SERPL W P-5'-P-CCNC: 6 U/L (ref 10–52)
ANION GAP SERPL CALC-SCNC: 14 MMOL/L (ref 10–20)
APPEARANCE UR: ABNORMAL
APTT PPP: 29 SECONDS (ref 27–38)
AST SERPL W P-5'-P-CCNC: 12 U/L (ref 9–39)
BASOPHILS # BLD AUTO: 0.02 X10*3/UL (ref 0–0.1)
BASOPHILS NFR BLD AUTO: 0.2 %
BILIRUB SERPL-MCNC: 0.4 MG/DL (ref 0–1.2)
BILIRUB UR STRIP.AUTO-MCNC: NEGATIVE MG/DL
BUN SERPL-MCNC: 60 MG/DL (ref 6–23)
CALCIUM SERPL-MCNC: 8.9 MG/DL (ref 8.6–10.3)
CHLORIDE SERPL-SCNC: 108 MMOL/L (ref 98–107)
CO2 SERPL-SCNC: 20 MMOL/L (ref 21–32)
COLOR UR: ABNORMAL
CREAT SERPL-MCNC: 3.13 MG/DL (ref 0.5–1.3)
EGFRCR SERPLBLD CKD-EPI 2021: 18 ML/MIN/1.73M*2
EOSINOPHIL # BLD AUTO: 0.01 X10*3/UL (ref 0–0.4)
EOSINOPHIL NFR BLD AUTO: 0.1 %
ERYTHROCYTE [DISTWIDTH] IN BLOOD BY AUTOMATED COUNT: 13.5 % (ref 11.5–14.5)
GLUCOSE SERPL-MCNC: 158 MG/DL (ref 74–99)
GLUCOSE UR STRIP.AUTO-MCNC: ABNORMAL MG/DL
HCT VFR BLD AUTO: 27.4 % (ref 41–52)
HEMOCCULT SP1 STL QL: NEGATIVE
HGB BLD-MCNC: 8.7 G/DL (ref 13.5–17.5)
IMM GRANULOCYTES # BLD AUTO: 0.04 X10*3/UL (ref 0–0.5)
IMM GRANULOCYTES NFR BLD AUTO: 0.4 % (ref 0–0.9)
INR PPP: 1.1 (ref 0.9–1.1)
KETONES UR STRIP.AUTO-MCNC: NEGATIVE MG/DL
LEUKOCYTE ESTERASE UR QL STRIP.AUTO: NEGATIVE
LYMPHOCYTES # BLD AUTO: 0.98 X10*3/UL (ref 0.8–3)
LYMPHOCYTES NFR BLD AUTO: 10.2 %
MCH RBC QN AUTO: 29.5 PG (ref 26–34)
MCHC RBC AUTO-ENTMCNC: 31.8 G/DL (ref 32–36)
MCV RBC AUTO: 93 FL (ref 80–100)
MONOCYTES # BLD AUTO: 0.54 X10*3/UL (ref 0.05–0.8)
MONOCYTES NFR BLD AUTO: 5.6 %
MUCOUS THREADS #/AREA URNS AUTO: ABNORMAL /LPF
NEUTROPHILS # BLD AUTO: 8.03 X10*3/UL (ref 1.6–5.5)
NEUTROPHILS NFR BLD AUTO: 83.5 %
NITRITE UR QL STRIP.AUTO: NEGATIVE
NRBC BLD-RTO: 0 /100 WBCS (ref 0–0)
PH UR STRIP.AUTO: 5.5 [PH]
PLATELET # BLD AUTO: 190 X10*3/UL (ref 150–450)
POTASSIUM SERPL-SCNC: 4.4 MMOL/L (ref 3.5–5.3)
PROT SERPL-MCNC: 6 G/DL (ref 6.4–8.2)
PROT UR STRIP.AUTO-MCNC: ABNORMAL MG/DL
PROTHROMBIN TIME: 12.8 SECONDS (ref 9.8–12.8)
RBC # BLD AUTO: 2.95 X10*6/UL (ref 4.5–5.9)
RBC # UR STRIP.AUTO: ABNORMAL /UL
RBC #/AREA URNS AUTO: >20 /HPF
SODIUM SERPL-SCNC: 138 MMOL/L (ref 136–145)
SP GR UR STRIP.AUTO: 1.01
UROBILINOGEN UR STRIP.AUTO-MCNC: NORMAL MG/DL
WBC # BLD AUTO: 9.6 X10*3/UL (ref 4.4–11.3)
WBC #/AREA URNS AUTO: ABNORMAL /HPF

## 2024-05-10 PROCEDURE — 82270 OCCULT BLOOD FECES: CPT | Performed by: INTERNAL MEDICINE

## 2024-05-10 PROCEDURE — 74176 CT ABD & PELVIS W/O CONTRAST: CPT | Mod: FOREIGN READ | Performed by: RADIOLOGY

## 2024-05-10 PROCEDURE — 2500000001 HC RX 250 WO HCPCS SELF ADMINISTERED DRUGS (ALT 637 FOR MEDICARE OP): Performed by: INTERNAL MEDICINE

## 2024-05-10 PROCEDURE — 2500000004 HC RX 250 GENERAL PHARMACY W/ HCPCS (ALT 636 FOR OP/ED): Performed by: INTERNAL MEDICINE

## 2024-05-10 PROCEDURE — 1200000002 HC GENERAL ROOM WITH TELEMETRY DAILY

## 2024-05-10 PROCEDURE — 99285 EMERGENCY DEPT VISIT HI MDM: CPT | Mod: 25

## 2024-05-10 PROCEDURE — 85610 PROTHROMBIN TIME: CPT | Performed by: INTERNAL MEDICINE

## 2024-05-10 PROCEDURE — 74176 CT ABD & PELVIS W/O CONTRAST: CPT

## 2024-05-10 PROCEDURE — 2500000006 HC RX 250 W HCPCS SELF ADMINISTERED DRUGS (ALT 637 FOR ALL PAYERS): Performed by: INTERNAL MEDICINE

## 2024-05-10 PROCEDURE — 99222 1ST HOSP IP/OBS MODERATE 55: CPT | Performed by: INTERNAL MEDICINE

## 2024-05-10 PROCEDURE — 96374 THER/PROPH/DIAG INJ IV PUSH: CPT

## 2024-05-10 PROCEDURE — 73552 X-RAY EXAM OF FEMUR 2/>: CPT | Mod: RT

## 2024-05-10 PROCEDURE — 81001 URINALYSIS AUTO W/SCOPE: CPT | Performed by: INTERNAL MEDICINE

## 2024-05-10 PROCEDURE — 85025 COMPLETE CBC W/AUTO DIFF WBC: CPT | Performed by: INTERNAL MEDICINE

## 2024-05-10 PROCEDURE — 73502 X-RAY EXAM HIP UNI 2-3 VIEWS: CPT | Mod: RT

## 2024-05-10 PROCEDURE — 36415 COLL VENOUS BLD VENIPUNCTURE: CPT | Performed by: INTERNAL MEDICINE

## 2024-05-10 PROCEDURE — 73552 X-RAY EXAM OF FEMUR 2/>: CPT | Mod: RIGHT SIDE | Performed by: RADIOLOGY

## 2024-05-10 PROCEDURE — 73502 X-RAY EXAM HIP UNI 2-3 VIEWS: CPT | Mod: RIGHT SIDE | Performed by: RADIOLOGY

## 2024-05-10 PROCEDURE — C9113 INJ PANTOPRAZOLE SODIUM, VIA: HCPCS | Performed by: INTERNAL MEDICINE

## 2024-05-10 PROCEDURE — 84075 ASSAY ALKALINE PHOSPHATASE: CPT | Performed by: INTERNAL MEDICINE

## 2024-05-10 RX ORDER — TRAMADOL HYDROCHLORIDE 50 MG/1
50 TABLET ORAL EVERY 12 HOURS PRN
Status: DISCONTINUED | OUTPATIENT
Start: 2024-05-10 | End: 2024-05-13

## 2024-05-10 RX ORDER — ACETAMINOPHEN 160 MG/5ML
650 SOLUTION ORAL EVERY 4 HOURS PRN
Status: DISCONTINUED | OUTPATIENT
Start: 2024-05-10 | End: 2024-05-13

## 2024-05-10 RX ORDER — PANTOPRAZOLE SODIUM 40 MG/1
40 TABLET, DELAYED RELEASE ORAL
Status: DISCONTINUED | OUTPATIENT
Start: 2024-05-11 | End: 2024-05-13

## 2024-05-10 RX ORDER — ONDANSETRON HYDROCHLORIDE 2 MG/ML
4 INJECTION, SOLUTION INTRAVENOUS EVERY 8 HOURS PRN
Status: DISCONTINUED | OUTPATIENT
Start: 2024-05-10 | End: 2024-05-13 | Stop reason: HOSPADM

## 2024-05-10 RX ORDER — TALC
3 POWDER (GRAM) TOPICAL NIGHTLY PRN
Status: DISCONTINUED | OUTPATIENT
Start: 2024-05-10 | End: 2024-05-13

## 2024-05-10 RX ORDER — LANOLIN ALCOHOL/MO/W.PET/CERES
1000 CREAM (GRAM) TOPICAL DAILY
Status: DISCONTINUED | OUTPATIENT
Start: 2024-05-10 | End: 2024-05-13

## 2024-05-10 RX ORDER — POLYETHYLENE GLYCOL 3350 17 G/17G
17 POWDER, FOR SOLUTION ORAL DAILY PRN
Status: DISCONTINUED | OUTPATIENT
Start: 2024-05-10 | End: 2024-05-13

## 2024-05-10 RX ORDER — GUAIFENESIN 600 MG/1
600 TABLET, EXTENDED RELEASE ORAL EVERY 12 HOURS PRN
Status: DISCONTINUED | OUTPATIENT
Start: 2024-05-10 | End: 2024-05-13

## 2024-05-10 RX ORDER — SIMVASTATIN 40 MG/1
40 TABLET, FILM COATED ORAL NIGHTLY
Status: DISCONTINUED | OUTPATIENT
Start: 2024-05-10 | End: 2024-05-13

## 2024-05-10 RX ORDER — ONDANSETRON 4 MG/1
4 TABLET, FILM COATED ORAL EVERY 8 HOURS PRN
Status: DISCONTINUED | OUTPATIENT
Start: 2024-05-10 | End: 2024-05-13

## 2024-05-10 RX ORDER — AMLODIPINE BESYLATE 10 MG/1
10 TABLET ORAL DAILY
Status: DISCONTINUED | OUTPATIENT
Start: 2024-05-10 | End: 2024-05-13 | Stop reason: HOSPADM

## 2024-05-10 RX ORDER — METOPROLOL SUCCINATE 50 MG/1
50 TABLET, EXTENDED RELEASE ORAL DAILY
Status: DISCONTINUED | OUTPATIENT
Start: 2024-05-10 | End: 2024-05-13

## 2024-05-10 RX ORDER — ACETAMINOPHEN 650 MG/1
650 SUPPOSITORY RECTAL EVERY 4 HOURS PRN
Status: DISCONTINUED | OUTPATIENT
Start: 2024-05-10 | End: 2024-05-13

## 2024-05-10 RX ORDER — PANTOPRAZOLE SODIUM 40 MG/10ML
40 INJECTION, POWDER, LYOPHILIZED, FOR SOLUTION INTRAVENOUS ONCE
Status: COMPLETED | OUTPATIENT
Start: 2024-05-10 | End: 2024-05-10

## 2024-05-10 RX ORDER — PANTOPRAZOLE SODIUM 40 MG/10ML
40 INJECTION, POWDER, LYOPHILIZED, FOR SOLUTION INTRAVENOUS
Status: DISCONTINUED | OUTPATIENT
Start: 2024-05-11 | End: 2024-05-13

## 2024-05-10 RX ORDER — ACETAMINOPHEN 325 MG/1
650 TABLET ORAL EVERY 4 HOURS PRN
Status: DISCONTINUED | OUTPATIENT
Start: 2024-05-10 | End: 2024-05-13

## 2024-05-10 RX ORDER — POLYETHYLENE GLYCOL 3350 17 G/17G
17 POWDER, FOR SOLUTION ORAL DAILY
Status: DISCONTINUED | OUTPATIENT
Start: 2024-05-10 | End: 2024-05-13 | Stop reason: HOSPADM

## 2024-05-10 RX ADMIN — PANTOPRAZOLE SODIUM 40 MG: 40 INJECTION, POWDER, FOR SOLUTION INTRAVENOUS at 17:13

## 2024-05-10 RX ADMIN — SIMVASTATIN 40 MG: 40 TABLET, FILM COATED ORAL at 21:14

## 2024-05-10 RX ADMIN — AMLODIPINE BESYLATE 10 MG: 10 TABLET ORAL at 18:49

## 2024-05-10 RX ADMIN — IRON SUCROSE 200 MG: 20 INJECTION, SOLUTION INTRAVENOUS at 18:51

## 2024-05-10 RX ADMIN — CYANOCOBALAMIN TAB 500 MCG 1000 MCG: 500 TAB at 18:49

## 2024-05-10 RX ADMIN — POLYETHYLENE GLYCOL 3350 17 G: 17 POWDER, FOR SOLUTION ORAL at 18:51

## 2024-05-10 RX ADMIN — METOPROLOL SUCCINATE 50 MG: 50 TABLET, EXTENDED RELEASE ORAL at 18:49

## 2024-05-10 SDOH — SOCIAL STABILITY: SOCIAL NETWORK: ARE YOU MARRIED, WIDOWED, DIVORCED, SEPARATED, NEVER MARRIED, OR LIVING WITH A PARTNER?: WIDOWED

## 2024-05-10 SDOH — SOCIAL STABILITY: SOCIAL INSECURITY: WITHIN THE LAST YEAR, HAVE YOU BEEN AFRAID OF YOUR PARTNER OR EX-PARTNER?: NO

## 2024-05-10 SDOH — HEALTH STABILITY: MENTAL HEALTH
STRESS IS WHEN SOMEONE FEELS TENSE, NERVOUS, ANXIOUS, OR CAN'T SLEEP AT NIGHT BECAUSE THEIR MIND IS TROUBLED. HOW STRESSED ARE YOU?: NOT AT ALL

## 2024-05-10 SDOH — SOCIAL STABILITY: SOCIAL NETWORK: HOW OFTEN DO YOU ATTEND CHURCH OR RELIGIOUS SERVICES?: MORE THAN 4 TIMES PER YEAR

## 2024-05-10 SDOH — SOCIAL STABILITY: SOCIAL INSECURITY
WITHIN THE LAST YEAR, HAVE YOU BEEN KICKED, HIT, SLAPPED, OR OTHERWISE PHYSICALLY HURT BY YOUR PARTNER OR EX-PARTNER?: NO

## 2024-05-10 SDOH — SOCIAL STABILITY: SOCIAL INSECURITY: WITHIN THE LAST YEAR, HAVE YOU BEEN HUMILIATED OR EMOTIONALLY ABUSED IN OTHER WAYS BY YOUR PARTNER OR EX-PARTNER?: NO

## 2024-05-10 SDOH — SOCIAL STABILITY: SOCIAL NETWORK
DO YOU BELONG TO ANY CLUBS OR ORGANIZATIONS SUCH AS CHURCH GROUPS UNIONS, FRATERNAL OR ATHLETIC GROUPS, OR SCHOOL GROUPS?: NO

## 2024-05-10 SDOH — SOCIAL STABILITY: SOCIAL INSECURITY
WITHIN THE LAST YEAR, HAVE TO BEEN RAPED OR FORCED TO HAVE ANY KIND OF SEXUAL ACTIVITY BY YOUR PARTNER OR EX-PARTNER?: NO

## 2024-05-10 SDOH — HEALTH STABILITY: PHYSICAL HEALTH: ON AVERAGE, HOW MANY DAYS PER WEEK DO YOU ENGAGE IN MODERATE TO STRENUOUS EXERCISE (LIKE A BRISK WALK)?: 0 DAYS

## 2024-05-10 SDOH — SOCIAL STABILITY: SOCIAL INSECURITY: HAVE YOU HAD THOUGHTS OF HARMING ANYONE ELSE?: YES

## 2024-05-10 SDOH — ECONOMIC STABILITY: FOOD INSECURITY: WITHIN THE PAST 12 MONTHS, THE FOOD YOU BOUGHT JUST DIDN'T LAST AND YOU DIDN'T HAVE MONEY TO GET MORE.: NEVER TRUE

## 2024-05-10 SDOH — SOCIAL STABILITY: SOCIAL NETWORK
IN A TYPICAL WEEK, HOW MANY TIMES DO YOU TALK ON THE PHONE WITH FAMILY, FRIENDS, OR NEIGHBORS?: MORE THAN THREE TIMES A WEEK

## 2024-05-10 SDOH — SOCIAL STABILITY: SOCIAL NETWORK: HOW OFTEN DO YOU ATTENT MEETINGS OF THE CLUB OR ORGANIZATION YOU BELONG TO?: NEVER

## 2024-05-10 SDOH — SOCIAL STABILITY: SOCIAL NETWORK: HOW OFTEN DO YOU GET TOGETHER WITH FRIENDS OR RELATIVES?: MORE THAN THREE TIMES A WEEK

## 2024-05-10 SDOH — SOCIAL STABILITY: SOCIAL INSECURITY: WERE YOU ABLE TO COMPLETE ALL THE BEHAVIORAL HEALTH SCREENINGS?: YES

## 2024-05-10 ASSESSMENT — PATIENT HEALTH QUESTIONNAIRE - PHQ9
2. FEELING DOWN, DEPRESSED OR HOPELESS: NOT AT ALL
1. LITTLE INTEREST OR PLEASURE IN DOING THINGS: NOT AT ALL
SUM OF ALL RESPONSES TO PHQ9 QUESTIONS 1 & 2: 0

## 2024-05-10 ASSESSMENT — ACTIVITIES OF DAILY LIVING (ADL)
DRESSING YOURSELF: INDEPENDENT
JUDGMENT_ADEQUATE_SAFELY_COMPLETE_DAILY_ACTIVITIES: YES
WALKS IN HOME: NEEDS ASSISTANCE
ADEQUATE_TO_COMPLETE_ADL: YES
HEARING - LEFT EAR: FUNCTIONAL
PATIENT'S MEMORY ADEQUATE TO SAFELY COMPLETE DAILY ACTIVITIES?: YES
BATHING: NEEDS ASSISTANCE
FEEDING YOURSELF: INDEPENDENT
LACK_OF_TRANSPORTATION: NO
HEARING - RIGHT EAR: FUNCTIONAL
TOILETING: NEEDS ASSISTANCE
GROOMING: INDEPENDENT

## 2024-05-10 ASSESSMENT — PAIN SCALES - GENERAL
PAINLEVEL_OUTOF10: 5 - MODERATE PAIN
PAINLEVEL_OUTOF10: 0 - NO PAIN
PAINLEVEL_OUTOF10: 5 - MODERATE PAIN
PAINLEVEL_OUTOF10: 5 - MODERATE PAIN

## 2024-05-10 ASSESSMENT — LIFESTYLE VARIABLES
SUBSTANCE_ABUSE_PAST_12_MONTHS: NO
PRESCIPTION_ABUSE_PAST_12_MONTHS: NO

## 2024-05-10 ASSESSMENT — COLUMBIA-SUICIDE SEVERITY RATING SCALE - C-SSRS
6. HAVE YOU EVER DONE ANYTHING, STARTED TO DO ANYTHING, OR PREPARED TO DO ANYTHING TO END YOUR LIFE?: NO
2. HAVE YOU ACTUALLY HAD ANY THOUGHTS OF KILLING YOURSELF?: NO
1. IN THE PAST MONTH, HAVE YOU WISHED YOU WERE DEAD OR WISHED YOU COULD GO TO SLEEP AND NOT WAKE UP?: NO

## 2024-05-10 ASSESSMENT — COGNITIVE AND FUNCTIONAL STATUS - GENERAL
PATIENT BASELINE BEDBOUND: NO
MOVING FROM LYING ON BACK TO SITTING ON SIDE OF FLAT BED WITH BEDRAILS: A LITTLE
STANDING UP FROM CHAIR USING ARMS: A LITTLE
MOBILITY SCORE: 16
HELP NEEDED FOR BATHING: A LITTLE
WALKING IN HOSPITAL ROOM: A LOT
CLIMB 3 TO 5 STEPS WITH RAILING: A LOT
TOILETING: A LITTLE
MOVING TO AND FROM BED TO CHAIR: A LITTLE
TURNING FROM BACK TO SIDE WHILE IN FLAT BAD: A LITTLE
DAILY ACTIVITIY SCORE: 22

## 2024-05-10 ASSESSMENT — ENCOUNTER SYMPTOMS: HIP PAIN: 1

## 2024-05-10 ASSESSMENT — PAIN - FUNCTIONAL ASSESSMENT: PAIN_FUNCTIONAL_ASSESSMENT: 0-10

## 2024-05-10 NOTE — H&P
Edward Johnson is a 89 y.o. male   Fall    Hip Pain        Patient with a past medical history of CVA with right-sided hemiparesis hypertension dyslipidemia osteoarthritis was doing well until today when he had an accidental fall while in the bathroom  He landed on his right hip and was unable to get up and call EMS and was brought to the hospital  Workup at the emergency room showed a large hematoma on his right buttock along with a drop in blood count  Patient denies any chest pain palpitation shortness of breath  Denies any loss of consciousness headaches or vision change  He denies seeing any blood in the stool or melena or abdominal discomfort    Past Medical History  Past Medical History:   Diagnosis Date    Personal history of diseases of the blood and blood-forming organs and certain disorders involving the immune mechanism     History of hemorrhagic diathesis    Personal history of other diseases of the circulatory system     History of cardiac disorder    Personal history of other diseases of the circulatory system     History of hypertension    Personal history of other diseases of the musculoskeletal system and connective tissue     History of arthritis       Surgical History  Past Surgical History:   Procedure Laterality Date    CHOLECYSTECTOMY  09/28/2016    Cholecystectomy    CORONARY ARTERY BYPASS GRAFT  09/28/2016    CABG    MR HEAD ANGIO WO IV CONTRAST  8/5/2013    MR HEAD ANGIO WO IV CONTRAST 8/5/2013 [facility] CLINICAL LEGACY    MR PELVIS ANGIO WO IV CONTRAST  9/9/2016    MR PELVIS ANGIO WO IV CONTRAST 9/9/2016 [facility] ANCILLARY LEGACY    OTHER SURGICAL HISTORY  09/28/2016    Endarterectomy Common Carotid    OTHER SURGICAL HISTORY  04/14/2017    Aortic Valve Replacement Transcatheter Percutaneous Femoral Artery Approach        Social History  He reports that he has never smoked. He has never used smokeless tobacco. He reports that he does not drink alcohol and does not use drugs.    Family  History  Family History   Problem Relation Name Age of Onset    Coronary artery disease Mother          Allergies  Patient has no known allergies.    Review of Systems     Constitutional: not feeling poorly, no fever, no recent weight gain and no recent weight loss.   Eyes: no blurred vision and no diplopia.   ENT: no hearing loss, no tinnitus, no earache, no sore throat, no hoarseness and no swollen glands in the neck.   Cardiovascular: no chest pain, no tightness or heavy pressure, no shortness of breath, no palpitations and no lower extremity edema.   Respiratory: no cough, wheezing or shortness of breath at rest or exertion  Gastrointestinal: no change in bowel habits, no diarrhea, no constipation, no bloody stools, no nausea, no vomiting, no abdominal pain, no signs and symptoms of ulcer disease, no rosalia colored stools and no intolerance to fatty foods.   Genitourinary: no urinary frequency, no dysuria, no hematuria, no burning sensation during urination, urinary stream is not smaller and urinary stream does not start and stop.   Musculoskeletal: Some difficulty walking  Skin: no rashes, no change in skin color and pigmentation, no skin lesions and no skin lumps.   Neurological: no headaches, no dizziness, no seizures, no tingling, no numbness, no signs and symptoms of stroke and no limb weakness.   Psychiatric: no confusion, no memory lapses or loss, no depression and no sleep disturbances.   Endocrine: no goiter, no thyroid disorder, no diabetes mellitus, no excessive thirst, no dry skin, no cold intolerance, no heat intolerance and no increased urinary frequency.   Hematologic/Lymphatic: is not slow to heal, does not bleed easily, does not bruise easily, no thrombophlebitis, no anemia and no history of blood transfusion.   All other systems have been reviewed and are negative for complaint.     Vitals:    05/10/24 1849   BP: 131/68   Pulse: 100   Resp:    Temp:    SpO2:         Scheduled  medications  amLODIPine, 10 mg, oral, Daily  cyanocobalamin, 1,000 mcg, oral, Daily  iron sucrose, 200 mg, intravenous, Once  metoprolol succinate XL, 50 mg, oral, Daily  [START ON 5/11/2024] pantoprazole, 40 mg, oral, Daily before breakfast   Or  [START ON 5/11/2024] pantoprazole, 40 mg, intravenous, Daily before breakfast  polyethylene glycol, 17 g, oral, Daily  simvastatin, 40 mg, oral, Nightly      Continuous medications     PRN medications  PRN medications: acetaminophen **OR** acetaminophen **OR** acetaminophen, guaiFENesin, melatonin, ondansetron **OR** ondansetron, polyethylene glycol    Results from last 7 days   Lab Units 05/10/24  1440   WBC AUTO x10*3/uL 9.6   HEMOGLOBIN g/dL 8.7*   HEMATOCRIT % 27.4*   PLATELETS AUTO x10*3/uL 190     Results from last 7 days   Lab Units 05/10/24  1440   SODIUM mmol/L 138   POTASSIUM mmol/L 4.4   CHLORIDE mmol/L 108*   CO2 mmol/L 20*   BUN mg/dL 60*   CREATININE mg/dL 3.13*   CALCIUM mg/dL 8.9   PROTEIN TOTAL g/dL 6.0*   BILIRUBIN TOTAL mg/dL 0.4   ALK PHOS U/L 66   ALT U/L 6*   AST U/L 12   GLUCOSE mg/dL 158*            CT abdomen pelvis wo IV contrast   Final Result   Large  13.1 x 5.6 x 5.5 cm subcutaneous hematoma over the lateral   aspect of the right hip.  Interstitial hemorrhage is seen throughout   the subcutaneous tissues surrounding the hematoma.   Status post TAPVR and CABG with calcification of the native coronary   arteries and mitral annulus.   Multiple subtle soft tissue densities seen on the lateral wall.    Although this could be sediment, the possibility of polypoid lesions   must be excluded.  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   Prostatic enlargement impinging on the bladder base.   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   Left hydrocele.   Right inguinal hernia  containing fluid.   Signed by Nikunj Chappell      XR hip right with pelvis when performed 2 or 3 views   Final Result   No acute bony injury demonstrated..   Signed by Nikunj Chappell      XR femur right 2+ views   Final Result   No acute injury demonstrated..   Signed by Nikunj Chappell          Physical Exam     Constitutional   General appearance: Alert and in no acute distress.   Eyes   Inspection of eyes: Sclera and conjunctiva were normal.    Pupil exam: Pupils were equal in size. Extraocular movements were intact.   Pulmonary   Respiratory assessment: No respiratory distress, normal respiratory rhythm and effort.    Auscultation of Lungs: Clear bilateral breath sounds.   Cardiovascular   Auscultation of heart: Apical pulse normal, heart rate and rhythm normal, normal S1 and S2, no murmurs and no pericardial rub.    Exam for edema: No peripheral edema.   Abdomen   Abdominal Exam: No bruits, normal bowel sounds, soft, non-tender, no abdominal mass palpated.    Liver and Spleen exam: No hepato-splenomegaly.   Musculoskeletal       Inspection of digits and nails: No clubbing or cyanosis of the fingernails.    Inspection/palpation of joints, bones and muscles: Right buttock hematoma  Skin   Skin inspection: N ecchymosis over right buttock  Neurologic   Cranial nerves: Nerves 2-12 were intact, no focal neuro defects.   Right-sided weakness  Psychiatric   Orientation: Oriented to person, place, and time.    Mood and affect: Normal.       Assessment/Plan      #Acute blood loss anemia  #Right buttock hematoma status mechanical fall  Patient is a [employer]  Will give IV Venofer x 1  Monitor CBC BMP  ER doctor noticed some black specks in the stool  The patient however denies any black stool or blood in the stool  Hemoccult has been sent  Will continue PPI    #Acute on chronic injury injury  Likely brought on by the sudden blood loss  Encourage fluids and monitor    #Hypertension  Continue medications and hold for systolic  blood pressure less than 110    #Dyslipidemia  Continue statins    #History of CVA  Hold aspirin for now    Will get physical Occupational Therapy consult

## 2024-05-10 NOTE — ED PROVIDER NOTES
HPI     CC: Fall and Hip Pain     HPI: Edward Johnson is a 89 y.o. male with a history of HTN, CAD s/p CABG on aspirin, CVA, CHF, CKD, DM, presents with right hip pain and bruising.  Patient states that he lost his balance in the bathroom last night around 10 PM, hit his right hip on the shower.  Denies head strike.  He called EMS and they were not concerned that it was broken so he stayed home, went back to bed.  Daughter was concerned because he has a big bruise on his right hip and is complaining of ongoing hip pain.  He has been able to ambulate.  He denies any numbness or tingling or reduced ROM.  He has some scattered bruising to the arms as well but denies any pain in his upper extremities.  Denies abdominal, neck, back, or chest pain.    ROS: 10-point review of systems was performed and is otherwise negative except as noted in HPI.    Limitations to history: N/A    Independent Historians: Daughter at bedside    External Records Reviewed: Outpatient notes in EMR    Past Medical History: Noncontributory except per HPI     Past Surgical History: Noncontributory except per HPI     Family History: Reviewed and noncontributory     Social History:  Denies tobacco. Denies ETOH. Denies illicit drugs.    Social Determinants Affecting Care: N/A    No Known Allergies    Home Meds:   Current Outpatient Medications   Medication Instructions    amLODIPine (NORVASC) 10 mg, oral, Daily    aspirin 81 mg EC tablet TAKE 1 TABLET DAILY.    cholecalciferol (Vitamin D-3) 50 mcg (2,000 unit) capsule TAKE 1 CAPSULE Daily    coenzyme Q10-vitamin E 100-5 mg-unit capsule TAKE 1 CAPSULE Daily    cyanocobalamin (Vitamin B-12) 1,000 mcg tablet TAKE 1 TABLET DAILY AS DIRECTED.    famotidine (Pepcid) 20 mg tablet famotidine 20 mg tablet   TAKE 1 TABLET BY MOUTH EVERY DAY    metoprolol succinate XL (TOPROL-XL) 50 mg, oral, Daily    polyethylene glycol (Miralax) 17 gram/dose powder MIX 1 CAPFUL (17GM) IN 8 OUNCES OF WATER, JUICE, OR TEA AND  "DRINK DAILY.    simvastatin (ZOCOR) 40 mg, oral, Daily, as directed        Physical Exam     ED Triage Vitals [05/10/24 1323]   Temperature Heart Rate Respirations BP   36.4 °C (97.5 °F) 82 18 126/75      Pulse Ox Temp Source Heart Rate Source Patient Position   100 % Oral Monitor --      BP Location FiO2 (%)     -- --         Heart Rate:  [80-89]   Temperature:  [36.4 °C (97.5 °F)]   Respirations:  [15-19]   BP: (101-132)/(59-79)   Height:  [165.1 cm (5' 5\")]   Weight:  [82.6 kg (182 lb)]   Pulse Ox:  [100 %]      Physical Exam  Vitals and nursing note reviewed.     CONSTITUTIONAL: Well appearing, well nourished, in no acute distress.   HENMT: Head atraumatic. No facial or scalp TTP. Airway patent. Nasal mucosa clear. Mouth with normal mucosa, clear oropharynx. Uvula midline. TMs clear bilaterally with no hemotympanum. Neck supple.    EYES: Clear bilaterally. No periorbital TTP.  Pupils equally round and reactive to light, extraocular movements grossly intact.    CARDIOVASCULAR: Normal rate, regular rhythm.  Heart sounds S1, S2.  No murmurs, rubs or gallops. Normal pulses. Capillary refill <2 sec.   RESPIRATORY: No increased work of breathing. Breath sounds clear and equal bilaterally.  GASTROINTESTINAL: Abdomen soft, non-distended, non-tender. No rebound, no guarding. Bowel sounds normal in all 4 quadrants. No palpable masses.   GENITOURINARY:  No CVA tenderness.  MUSCULOSKELETAL: Large ecchymosis right buttock and right inguinal region/proximal femur with overlying tenderness, mildly reduced strength on hip flexion but otherwise F ROM.  Scattered ecchymosis bilateral upper extremities without overlying tenderness or reduced ROM.  2+ RP.  Nonpalpable DP/PT bilaterally.  No midline C/T/L TTP or stepoffs. No other muscle or joint deformity or TTP.  2+ BLE edema.  NEUROLOGICAL: GCS 15. Alert and oriented, no asymmetry, moving all extremities equally.  SKIN: Warm, dry and intact. No rash. No pace sign, raccoon eyes " or other notable skin lesions except as noted above.   PSYCHIATRIC: Normal mood and affect.  HEME/LYMPH: No adenopathy or splenomegaly.    Diagnostic Results      ECG: ECGs read and interpreted by me. See ED Course, below, for interpretation.    Labs Reviewed   CBC WITH AUTO DIFFERENTIAL - Abnormal       Result Value    WBC 9.6      nRBC 0.0      RBC 2.95 (*)     Hemoglobin 8.7 (*)     Hematocrit 27.4 (*)     MCV 93      MCH 29.5      MCHC 31.8 (*)     RDW 13.5      Platelets 190      Neutrophils % 83.5      Immature Granulocytes %, Automated 0.4      Lymphocytes % 10.2      Monocytes % 5.6      Eosinophils % 0.1      Basophils % 0.2      Neutrophils Absolute 8.03 (*)     Immature Granulocytes Absolute, Automated 0.04      Lymphocytes Absolute 0.98      Monocytes Absolute 0.54      Eosinophils Absolute 0.01      Basophils Absolute 0.02     COMPREHENSIVE METABOLIC PANEL - Abnormal    Glucose 158 (*)     Sodium 138      Potassium 4.4      Chloride 108 (*)     Bicarbonate 20 (*)     Anion Gap 14      Urea Nitrogen 60 (*)     Creatinine 3.13 (*)     eGFR 18 (*)     Calcium 8.9      Albumin 3.6      Alkaline Phosphatase 66      Total Protein 6.0 (*)     AST 12      Bilirubin, Total 0.4      ALT 6 (*)    OCCULT BLOOD X1, STOOL - Normal    Occult Blood, Stool X1 Negative     COAGULATION SCREEN - Normal    Protime 12.8      INR 1.1      aPTT 29      Narrative:     The APTT is no longer used for monitoring Unfractionated Heparin Therapy. For monitoring Heparin Therapy, use the Heparin Assay.   URINALYSIS WITH REFLEX CULTURE AND MICROSCOPIC    Narrative:     The following orders were created for panel order Urinalysis with Reflex Culture and Microscopic.  Procedure                               Abnormality         Status                     ---------                               -----------         ------                     Urinalysis with Reflex C...[[id number]]                      In process                 Extra Urine  Hooks Tube[[id number]]                            In process                   Please view results for these tests on the individual orders.   URINALYSIS WITH REFLEX CULTURE AND MICROSCOPIC   EXTRA URINE GRAY TUBE   URINALYSIS MICROSCOPIC WITH REFLEX CULTURE         CT abdomen pelvis wo IV contrast   Final Result   Large  13.1 x 5.6 x 5.5 cm subcutaneous hematoma over the lateral   aspect of the right hip.  Interstitial hemorrhage is seen throughout   the subcutaneous tissues surrounding the hematoma.   Status post TAPVR and CABG with calcification of the native coronary   arteries and mitral annulus.   Multiple subtle soft tissue densities seen on the lateral wall.    Although this could be sediment, the possibility of polypoid lesions   must be excluded.  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   Prostatic enlargement impinging on the bladder base.   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   Left hydrocele.   Right inguinal hernia containing fluid.   Signed by Nikunj Chappell      XR hip right with pelvis when performed 2 or 3 views   Final Result   No acute bony injury demonstrated..   Signed by Nikunj Chappell      XR femur right 2+ views   Final Result   No acute injury demonstrated..   Signed by Nikunj Chappell                    Yao Coma Scale Score: 15                  Procedure  Procedures    ED Course & MDM   Assessment/Plan:   Edward Johnson is a 89 y.o. male with a history of HTN, CAD s/p CABG on aspirin, CVA, CHF, CKD, DM, presents with right hip pain and bruising.  He has a notable hematoma to the right buttock and hip that is tender to palpation.  He has tenderness over the proximal femur and posterior hip, no significant abdominal tenderness.  Presentation is concerning for hip hematoma versus fracture.  Will obtain x-ray.  Given  significant size of hematoma will obtain labs and CT imaging to assess size and rule out RP bleeding.  No evidence of acute traumatic injury elsewhere on full trauma exam.  He is hemodynamically stable and generally well-appearing. See below for details of ED course and ultimate disposition.    Medications   pantoprazole (ProtoNix) injection 40 mg (40 mg intravenous Given 5/10/24 1713)        ED Course as of 05/10/24 1921   Fri May 10, 2024   1555 XR hip/femur negative [CG]   1557 Labs are notable for CBC without leukocytosis, hemoglobin 8.7 from 12.5 4 months ago, CMP with hyperglycemia 158, hyperchloremia 108, elevated BUN 60 and creatinine 3.13 (stable), normal coags [CG]   1637 CTAP shows a large 13.1 x 5.6 x 5.5 cm subcutaneous hematoma over the lateral aspect of the right hip with interstitial hemorrhage surrounding as well as other chronic findings. [CG]   1646 Family members state that patient has had blood in his urine and stool for some time. They also notified me he is a Judaism and will not accept blood products. Patient confirmed this.  [CG]   1652 RODGER brown stool with black flecks. FOBT negative. [CG]   1806 Patient admitted under Dr. Ash for further workup.  UA still pending but appears dark brown in color. [CG]   1813 UA negative for UTI.  [CG]      ED Course User Index  [CG] Tracie Kan MD         Diagnoses as of 05/10/24 1921   Hematoma of right hip, initial encounter   Blood loss anemia   Fall from standing, initial encounter       Disposition:   Admitted to Dr. Ash's team, discussed differential and findings with patient as well as any family members at bedside.      ED Prescriptions    None         Tracie Kan MD  EM/IM/Peds    This note was dictated by speech recognition. Minor errors in transcription may be present.     Tracie Kan MD  05/10/24 1921

## 2024-05-10 NOTE — ED TRIAGE NOTES
Pt arrives to ED via EMS for fall yesterday in the bathroom and hip pain post fall. Pt has right hip pain and bruising above right hip. Pt denies blood thinners. Pt is ambulatory for EMS.

## 2024-05-11 LAB
ANION GAP SERPL CALC-SCNC: 13 MMOL/L (ref 10–20)
BUN SERPL-MCNC: 60 MG/DL (ref 6–23)
CALCIUM SERPL-MCNC: 8.3 MG/DL (ref 8.6–10.3)
CHLORIDE SERPL-SCNC: 110 MMOL/L (ref 98–107)
CO2 SERPL-SCNC: 21 MMOL/L (ref 21–32)
CREAT SERPL-MCNC: 3.27 MG/DL (ref 0.5–1.3)
EGFRCR SERPLBLD CKD-EPI 2021: 17 ML/MIN/1.73M*2
ERYTHROCYTE [DISTWIDTH] IN BLOOD BY AUTOMATED COUNT: 13.8 % (ref 11.5–14.5)
GLUCOSE SERPL-MCNC: 116 MG/DL (ref 74–99)
HCT VFR BLD AUTO: 21.7 % (ref 41–52)
HGB BLD-MCNC: 7 G/DL (ref 13.5–17.5)
HOLD SPECIMEN: NORMAL
MCH RBC QN AUTO: 29.8 PG (ref 26–34)
MCHC RBC AUTO-ENTMCNC: 32.3 G/DL (ref 32–36)
MCV RBC AUTO: 92 FL (ref 80–100)
NRBC BLD-RTO: 0 /100 WBCS (ref 0–0)
PLATELET # BLD AUTO: 145 X10*3/UL (ref 150–450)
POTASSIUM SERPL-SCNC: 4.8 MMOL/L (ref 3.5–5.3)
RBC # BLD AUTO: 2.35 X10*6/UL (ref 4.5–5.9)
SODIUM SERPL-SCNC: 139 MMOL/L (ref 136–145)
WBC # BLD AUTO: 7.9 X10*3/UL (ref 4.4–11.3)

## 2024-05-11 PROCEDURE — 84154 ASSAY OF PSA FREE: CPT | Performed by: INTERNAL MEDICINE

## 2024-05-11 PROCEDURE — 82374 ASSAY BLOOD CARBON DIOXIDE: CPT | Performed by: INTERNAL MEDICINE

## 2024-05-11 PROCEDURE — 2500000001 HC RX 250 WO HCPCS SELF ADMINISTERED DRUGS (ALT 637 FOR MEDICARE OP): Performed by: INTERNAL MEDICINE

## 2024-05-11 PROCEDURE — 36415 COLL VENOUS BLD VENIPUNCTURE: CPT | Performed by: INTERNAL MEDICINE

## 2024-05-11 PROCEDURE — 99232 SBSQ HOSP IP/OBS MODERATE 35: CPT | Performed by: INTERNAL MEDICINE

## 2024-05-11 PROCEDURE — 2500000006 HC RX 250 W HCPCS SELF ADMINISTERED DRUGS (ALT 637 FOR ALL PAYERS): Performed by: INTERNAL MEDICINE

## 2024-05-11 PROCEDURE — 2500000004 HC RX 250 GENERAL PHARMACY W/ HCPCS (ALT 636 FOR OP/ED): Performed by: INTERNAL MEDICINE

## 2024-05-11 PROCEDURE — 85027 COMPLETE CBC AUTOMATED: CPT | Performed by: INTERNAL MEDICINE

## 2024-05-11 PROCEDURE — 1200000002 HC GENERAL ROOM WITH TELEMETRY DAILY

## 2024-05-11 RX ORDER — CEFTRIAXONE 1 G/50ML
1 INJECTION, SOLUTION INTRAVENOUS DAILY
Status: DISCONTINUED | OUTPATIENT
Start: 2024-05-11 | End: 2024-05-13

## 2024-05-11 RX ORDER — SODIUM CHLORIDE 9 MG/ML
75 INJECTION, SOLUTION INTRAVENOUS CONTINUOUS
Status: DISCONTINUED | OUTPATIENT
Start: 2024-05-11 | End: 2024-05-13

## 2024-05-11 RX ADMIN — CEFTRIAXONE SODIUM 1 G: 1 INJECTION, SOLUTION INTRAVENOUS at 14:18

## 2024-05-11 RX ADMIN — METOPROLOL SUCCINATE 50 MG: 50 TABLET, EXTENDED RELEASE ORAL at 08:46

## 2024-05-11 RX ADMIN — SIMVASTATIN 40 MG: 40 TABLET, FILM COATED ORAL at 21:59

## 2024-05-11 RX ADMIN — SODIUM CHLORIDE 75 ML/HR: 9 INJECTION, SOLUTION INTRAVENOUS at 11:35

## 2024-05-11 RX ADMIN — AMLODIPINE BESYLATE 10 MG: 10 TABLET ORAL at 08:46

## 2024-05-11 RX ADMIN — CYANOCOBALAMIN TAB 500 MCG 1000 MCG: 500 TAB at 08:46

## 2024-05-11 RX ADMIN — PANTOPRAZOLE SODIUM 40 MG: 40 TABLET, DELAYED RELEASE ORAL at 06:01

## 2024-05-11 RX ADMIN — POLYETHYLENE GLYCOL 3350 17 G: 17 POWDER, FOR SOLUTION ORAL at 08:45

## 2024-05-11 SDOH — SOCIAL STABILITY: SOCIAL INSECURITY: HAVE YOU HAD THOUGHTS OF HARMING ANYONE ELSE?: NO

## 2024-05-11 SDOH — SOCIAL STABILITY: SOCIAL INSECURITY: ARE THERE ANY APPARENT SIGNS OF INJURIES/BEHAVIORS THAT COULD BE RELATED TO ABUSE/NEGLECT?: NO

## 2024-05-11 SDOH — SOCIAL STABILITY: SOCIAL INSECURITY: HAVE YOU HAD ANY THOUGHTS OF HARMING ANYONE ELSE?: NO

## 2024-05-11 SDOH — SOCIAL STABILITY: SOCIAL INSECURITY: DO YOU FEEL ANYONE HAS EXPLOITED OR TAKEN ADVANTAGE OF YOU FINANCIALLY OR OF YOUR PERSONAL PROPERTY?: NO

## 2024-05-11 SDOH — SOCIAL STABILITY: SOCIAL INSECURITY: DOES ANYONE TRY TO KEEP YOU FROM HAVING/CONTACTING OTHER FRIENDS OR DOING THINGS OUTSIDE YOUR HOME?: NO

## 2024-05-11 SDOH — SOCIAL STABILITY: SOCIAL INSECURITY: DO YOU FEEL UNSAFE GOING BACK TO THE PLACE WHERE YOU ARE LIVING?: NO

## 2024-05-11 SDOH — SOCIAL STABILITY: SOCIAL INSECURITY: WERE YOU ABLE TO COMPLETE ALL THE BEHAVIORAL HEALTH SCREENINGS?: YES

## 2024-05-11 SDOH — SOCIAL STABILITY: SOCIAL INSECURITY: ABUSE: ADULT

## 2024-05-11 SDOH — SOCIAL STABILITY: SOCIAL INSECURITY: HAS ANYONE EVER THREATENED TO HURT YOUR FAMILY OR YOUR PETS?: NO

## 2024-05-11 SDOH — SOCIAL STABILITY: SOCIAL INSECURITY: ARE YOU OR HAVE YOU BEEN THREATENED OR ABUSED PHYSICALLY, EMOTIONALLY, OR SEXUALLY BY ANYONE?: NO

## 2024-05-11 ASSESSMENT — COGNITIVE AND FUNCTIONAL STATUS - GENERAL
TURNING FROM BACK TO SIDE WHILE IN FLAT BAD: A LITTLE
CLIMB 3 TO 5 STEPS WITH RAILING: A LOT
MOVING TO AND FROM BED TO CHAIR: A LITTLE
WALKING IN HOSPITAL ROOM: A LOT
MOVING FROM LYING ON BACK TO SITTING ON SIDE OF FLAT BED WITH BEDRAILS: A LITTLE
TOILETING: A LITTLE
HELP NEEDED FOR BATHING: A LITTLE
MOBILITY SCORE: 16
DAILY ACTIVITIY SCORE: 22
STANDING UP FROM CHAIR USING ARMS: A LITTLE

## 2024-05-11 ASSESSMENT — PATIENT HEALTH QUESTIONNAIRE - PHQ9
2. FEELING DOWN, DEPRESSED OR HOPELESS: NOT AT ALL
SUM OF ALL RESPONSES TO PHQ9 QUESTIONS 1 & 2: 0
1. LITTLE INTEREST OR PLEASURE IN DOING THINGS: NOT AT ALL

## 2024-05-11 ASSESSMENT — LIFESTYLE VARIABLES
SUBSTANCE_ABUSE_PAST_12_MONTHS: NO
HOW OFTEN DO YOU HAVE A DRINK CONTAINING ALCOHOL: NEVER
PRESCIPTION_ABUSE_PAST_12_MONTHS: NO
SKIP TO QUESTIONS 9-10: 1
HOW MANY STANDARD DRINKS CONTAINING ALCOHOL DO YOU HAVE ON A TYPICAL DAY: PATIENT DOES NOT DRINK
AUDIT-C TOTAL SCORE: 0
AUDIT-C TOTAL SCORE: 0
HOW OFTEN DO YOU HAVE 6 OR MORE DRINKS ON ONE OCCASION: NEVER

## 2024-05-11 ASSESSMENT — PAIN SCALES - GENERAL
PAINLEVEL_OUTOF10: 0 - NO PAIN
PAINLEVEL_OUTOF10: 0 - NO PAIN

## 2024-05-11 NOTE — PROGRESS NOTES
Edward Johnson is a 89 y.o. male     Positive orthostatics this morning  Family also reports that the patient has been having hematuria for the last few months      Review of Systems     Constitutional: no fever, no chills, not feeling poorly, not feeling tired   Cardiovascular: no chest pain   Respiratory: no cough, wheezing or shortness of breath a  Gastrointestinal: no abdominal pain, no constipation, no melena, no nausea, no diarrhea, no vomiting and no blood in stools.   Neurological: no headache,   All other systems have been reviewed and are negative for complaint.       Vitals:    05/11/24 1156   BP: 111/58   Pulse: 91   Resp: 16   Temp: 36.6 °C (97.9 °F)   SpO2: 97%        Scheduled medications  [Held by provider] amLODIPine, 10 mg, oral, Daily  cefTRIAXone, 1 g, intravenous, Daily  cyanocobalamin, 1,000 mcg, oral, Daily  metoprolol succinate XL, 50 mg, oral, Daily  pantoprazole, 40 mg, oral, Daily before breakfast   Or  pantoprazole, 40 mg, intravenous, Daily before breakfast  polyethylene glycol, 17 g, oral, Daily  simvastatin, 40 mg, oral, Nightly      Continuous medications  sodium chloride 0.9%, 75 mL/hr, Last Rate: 75 mL/hr (05/11/24 1135)      PRN medications  PRN medications: acetaminophen **OR** acetaminophen **OR** acetaminophen, guaiFENesin, melatonin, ondansetron **OR** ondansetron, polyethylene glycol, traMADol    Lab Review   Results from last 7 days   Lab Units 05/11/24  0640 05/10/24  1440   WBC AUTO x10*3/uL 7.9 9.6   HEMOGLOBIN g/dL 7.0* 8.7*   HEMATOCRIT % 21.7* 27.4*   PLATELETS AUTO x10*3/uL 145* 190     Results from last 7 days   Lab Units 05/11/24  0640 05/10/24  1440   SODIUM mmol/L 139 138   POTASSIUM mmol/L 4.8 4.4   CHLORIDE mmol/L 110* 108*   CO2 mmol/L 21 20*   BUN mg/dL 60* 60*   CREATININE mg/dL 3.27* 3.13*   CALCIUM mg/dL 8.3* 8.9   PROTEIN TOTAL g/dL  --  6.0*   BILIRUBIN TOTAL mg/dL  --  0.4   ALK PHOS U/L  --  66   ALT U/L  --  6*   AST U/L  --  12   GLUCOSE mg/dL 116*  158*            CT abdomen pelvis wo IV contrast   Final Result   Large  13.1 x 5.6 x 5.5 cm subcutaneous hematoma over the lateral   aspect of the right hip.  Interstitial hemorrhage is seen throughout   the subcutaneous tissues surrounding the hematoma.   Status post TAPVR and CABG with calcification of the native coronary   arteries and mitral annulus.   Multiple subtle soft tissue densities seen on the lateral wall.    Although this could be sediment, the possibility of polypoid lesions   must be excluded.  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   Prostatic enlargement impinging on the bladder base.   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   Left hydrocele.   Right inguinal hernia containing fluid.   Signed by Nikunj Port      XR hip right with pelvis when performed 2 or 3 views   Final Result   No acute bony injury demonstrated..   Signed by [facility]      XR femur right 2+ views   Final Result   No acute injury demonstrated..   Signed by Nikunj Port            Physical Exam     Constitutional   General appearance: Alert and in no acute distress.     Pulmonary   Respiratory assessment: No respiratory distress, normal respiratory rhythm and effort.    Auscultation of Lungs: Clear bilateral breath sounds.   Cardiovascular   Auscultation of heart: Apical pulse normal, heart rate and rhythm normal, normal S1 and S2, no murmurs and no pericardial rub.    Exam for edema: No peripheral edema.   Abdomen   Abdominal Exam: No bruits, normal bowel sounds, soft, non-tender, no abdominal mass palpated.    Liver and Spleen exam: No hepato-splenomegaly.   Musculoskeletal     Skin inspection: Large hematoma right hip area  Neurologic   Cranial nerves: Nerves 2-12 were intact, no focal neuro defects.     Assessment/Plan      #Blood loss  anemia  #Large hematoma on right hip  Patient is Mandaeism  Given IV iron  Monitor hemoglobin hematocrit    #Hematuria, chronic per family  ?  UTI  Start IV Rocephin  Check PSA    #Orthostatic hypotension  Hold blood pressure medicines  Gentle hydration    Acute on chronic kidney injury  Continue fluids    #Hypertension  Hold medications    #Dyslipidemia  Continue statins    PT OT eval pending

## 2024-05-11 NOTE — PROGRESS NOTES
Pharmacy Medication History Review    Per patient.     Edward Johnson is a 89 y.o. male admitted for Hematoma of right hip, initial encounter. Pharmacy reviewed the patient's [id number]-[id number] medications and allergies for accuracy.    The list below reflectives the updated PTA list. Please review each medication in order reconciliation for additional clarification and justification.       The list below reflectives the updated allergy list. Please review each documented allergy for additional clarification and justification.  Allergies  Reviewed by Saurabh Arthur RN on 5/10/2024   No Known Allergies         Below are additional concerns with the patient's PTA list.  Prior to Admission Medications   Prescriptions Last Dose Informant   amLODIPine (Norvasc) 10 mg tablet 5/9/2024    Sig: Take 1 tablet (10 mg) by mouth once daily.   aspirin 81 mg EC tablet 5/9/2024    Sig: Take 1 tablet (81 mg) by mouth once daily.   cholecalciferol (Vitamin D-3) 50 mcg (2,000 unit) capsule 5/9/2024    Sig: Take 1 capsule (50 mcg) by mouth early in the morning..   coenzyme Q10-vitamin E 100-5 mg-unit capsule 5/9/2024    Sig: Take 1 capsule by mouth once daily.   cyanocobalamin (Vitamin B-12) 1,000 mcg tablet 5/9/2024    Sig: Take 1 tablet (1,000 mcg) by mouth once daily.   famotidine (Pepcid) 20 mg tablet 5/9/2024    Sig: Take 1 tablet (20 mg) by mouth once daily.   metoprolol succinate XL (Toprol-XL) 50 mg 24 hr tablet 5/9/2024    Sig: Take 1 tablet (50 mg) by mouth once daily.   polyethylene glycol (Miralax) 17 gram/dose powder 5/9/2024    Sig: Take 17 g by mouth 1 time.   simvastatin (Zocor) 40 mg tablet 5/9/2024    Sig: TAKE 1 TABLET BY MOUTH ONCE  DAILY AS DIRECTED      Facility-Administered Medications: None        Nevaeh Salvador

## 2024-05-11 NOTE — CARE PLAN
The patient's goals for the shift include no falls    The clinical goals for the shift include safety

## 2024-05-12 ENCOUNTER — APPOINTMENT (OUTPATIENT)
Dept: RADIOLOGY | Facility: HOSPITAL | Age: 89
DRG: 811 | End: 2024-05-12
Payer: OTHER MISCELLANEOUS

## 2024-05-12 LAB
ANION GAP SERPL CALC-SCNC: 15 MMOL/L (ref 10–20)
BUN SERPL-MCNC: 61 MG/DL (ref 6–23)
CALCIUM SERPL-MCNC: 8.8 MG/DL (ref 8.6–10.3)
CHLORIDE SERPL-SCNC: 106 MMOL/L (ref 98–107)
CO2 SERPL-SCNC: 20 MMOL/L (ref 21–32)
CREAT SERPL-MCNC: 3.46 MG/DL (ref 0.5–1.3)
EGFRCR SERPLBLD CKD-EPI 2021: 16 ML/MIN/1.73M*2
ERYTHROCYTE [DISTWIDTH] IN BLOOD BY AUTOMATED COUNT: 14.1 % (ref 11.5–14.5)
GLUCOSE BLD MANUAL STRIP-MCNC: 207 MG/DL (ref 74–99)
GLUCOSE SERPL-MCNC: 121 MG/DL (ref 74–99)
HCT VFR BLD AUTO: 21.9 % (ref 41–52)
HGB BLD-MCNC: 7 G/DL (ref 13.5–17.5)
MCH RBC QN AUTO: 30 PG (ref 26–34)
MCHC RBC AUTO-ENTMCNC: 32 G/DL (ref 32–36)
MCV RBC AUTO: 94 FL (ref 80–100)
NRBC BLD-RTO: 0 /100 WBCS (ref 0–0)
PLATELET # BLD AUTO: 162 X10*3/UL (ref 150–450)
POTASSIUM SERPL-SCNC: 4.7 MMOL/L (ref 3.5–5.3)
RBC # BLD AUTO: 2.33 X10*6/UL (ref 4.5–5.9)
SODIUM SERPL-SCNC: 136 MMOL/L (ref 136–145)
WBC # BLD AUTO: 13.1 X10*3/UL (ref 4.4–11.3)

## 2024-05-12 PROCEDURE — 2500000005 HC RX 250 GENERAL PHARMACY W/O HCPCS: Performed by: INTERNAL MEDICINE

## 2024-05-12 PROCEDURE — 71045 X-RAY EXAM CHEST 1 VIEW: CPT | Performed by: RADIOLOGY

## 2024-05-12 PROCEDURE — 82947 ASSAY GLUCOSE BLOOD QUANT: CPT

## 2024-05-12 PROCEDURE — 71045 X-RAY EXAM CHEST 1 VIEW: CPT

## 2024-05-12 PROCEDURE — 84132 ASSAY OF SERUM POTASSIUM: CPT | Performed by: EMERGENCY MEDICINE

## 2024-05-12 PROCEDURE — 94660 CPAP INITIATION&MGMT: CPT

## 2024-05-12 PROCEDURE — 85027 COMPLETE CBC AUTOMATED: CPT | Performed by: INTERNAL MEDICINE

## 2024-05-12 PROCEDURE — 1200000002 HC GENERAL ROOM WITH TELEMETRY DAILY

## 2024-05-12 PROCEDURE — 2500000004 HC RX 250 GENERAL PHARMACY W/ HCPCS (ALT 636 FOR OP/ED): Performed by: INTERNAL MEDICINE

## 2024-05-12 PROCEDURE — 2500000002 HC RX 250 W HCPCS SELF ADMINISTERED DRUGS (ALT 637 FOR MEDICARE OP, ALT 636 FOR OP/ED): Performed by: INTERNAL MEDICINE

## 2024-05-12 PROCEDURE — 94640 AIRWAY INHALATION TREATMENT: CPT

## 2024-05-12 PROCEDURE — 5A09357 ASSISTANCE WITH RESPIRATORY VENTILATION, LESS THAN 24 CONSECUTIVE HOURS, CONTINUOUS POSITIVE AIRWAY PRESSURE: ICD-10-PCS | Performed by: INTERNAL MEDICINE

## 2024-05-12 PROCEDURE — 2500000006 HC RX 250 W HCPCS SELF ADMINISTERED DRUGS (ALT 637 FOR ALL PAYERS): Performed by: INTERNAL MEDICINE

## 2024-05-12 PROCEDURE — 2500000004 HC RX 250 GENERAL PHARMACY W/ HCPCS (ALT 636 FOR OP/ED)

## 2024-05-12 PROCEDURE — 2500000001 HC RX 250 WO HCPCS SELF ADMINISTERED DRUGS (ALT 637 FOR MEDICARE OP): Performed by: INTERNAL MEDICINE

## 2024-05-12 PROCEDURE — 2500000005 HC RX 250 GENERAL PHARMACY W/O HCPCS: Performed by: EMERGENCY MEDICINE

## 2024-05-12 PROCEDURE — 36415 COLL VENOUS BLD VENIPUNCTURE: CPT | Performed by: INTERNAL MEDICINE

## 2024-05-12 PROCEDURE — 99232 SBSQ HOSP IP/OBS MODERATE 35: CPT | Performed by: INTERNAL MEDICINE

## 2024-05-12 PROCEDURE — 36600 WITHDRAWAL OF ARTERIAL BLOOD: CPT

## 2024-05-12 PROCEDURE — 80048 BASIC METABOLIC PNL TOTAL CA: CPT | Performed by: INTERNAL MEDICINE

## 2024-05-12 RX ORDER — FUROSEMIDE 10 MG/ML
40 INJECTION INTRAMUSCULAR; INTRAVENOUS ONCE
Status: COMPLETED | OUTPATIENT
Start: 2024-05-12 | End: 2024-05-12

## 2024-05-12 RX ORDER — IPRATROPIUM BROMIDE AND ALBUTEROL SULFATE 2.5; .5 MG/3ML; MG/3ML
3 SOLUTION RESPIRATORY (INHALATION)
Status: DISCONTINUED | OUTPATIENT
Start: 2024-05-12 | End: 2024-05-12

## 2024-05-12 RX ORDER — FUROSEMIDE 10 MG/ML
20 INJECTION INTRAMUSCULAR; INTRAVENOUS ONCE
Status: COMPLETED | OUTPATIENT
Start: 2024-05-12 | End: 2024-05-12

## 2024-05-12 RX ORDER — IPRATROPIUM BROMIDE AND ALBUTEROL SULFATE 2.5; .5 MG/3ML; MG/3ML
3 SOLUTION RESPIRATORY (INHALATION) EVERY 2 HOUR PRN
Status: DISCONTINUED | OUTPATIENT
Start: 2024-05-12 | End: 2024-05-13

## 2024-05-12 RX ADMIN — Medication 8 L/MIN: at 00:13

## 2024-05-12 RX ADMIN — Medication 3 MG: at 20:34

## 2024-05-12 RX ADMIN — Medication 100 PERCENT: at 00:49

## 2024-05-12 RX ADMIN — IPRATROPIUM BROMIDE AND ALBUTEROL SULFATE 3 ML: 2.5; .5 SOLUTION RESPIRATORY (INHALATION) at 00:17

## 2024-05-12 RX ADMIN — METOPROLOL SUCCINATE 50 MG: 50 TABLET, EXTENDED RELEASE ORAL at 08:26

## 2024-05-12 RX ADMIN — CEFTRIAXONE SODIUM 1 G: 1 INJECTION, SOLUTION INTRAVENOUS at 08:26

## 2024-05-12 RX ADMIN — SIMVASTATIN 40 MG: 40 TABLET, FILM COATED ORAL at 20:20

## 2024-05-12 RX ADMIN — FUROSEMIDE 20 MG: 10 INJECTION, SOLUTION INTRAMUSCULAR; INTRAVENOUS at 20:19

## 2024-05-12 RX ADMIN — PANTOPRAZOLE SODIUM 40 MG: 40 TABLET, DELAYED RELEASE ORAL at 06:41

## 2024-05-12 RX ADMIN — Medication 6 L/MIN: at 20:48

## 2024-05-12 RX ADMIN — FUROSEMIDE 20 MG: 10 INJECTION, SOLUTION INTRAMUSCULAR; INTRAVENOUS at 00:17

## 2024-05-12 RX ADMIN — Medication 2 L/MIN: at 03:25

## 2024-05-12 RX ADMIN — IPRATROPIUM BROMIDE AND ALBUTEROL SULFATE 3 ML: 2.5; .5 SOLUTION RESPIRATORY (INHALATION) at 17:56

## 2024-05-12 RX ADMIN — FUROSEMIDE 20 MG: 10 INJECTION, SOLUTION INTRAMUSCULAR; INTRAVENOUS at 00:47

## 2024-05-12 RX ADMIN — CYANOCOBALAMIN TAB 500 MCG 1000 MCG: 500 TAB at 08:26

## 2024-05-12 RX ADMIN — IPRATROPIUM BROMIDE AND ALBUTEROL SULFATE 3 ML: 2.5; .5 SOLUTION RESPIRATORY (INHALATION) at 20:48

## 2024-05-12 RX ADMIN — FUROSEMIDE 40 MG: 10 INJECTION, SOLUTION INTRAMUSCULAR; INTRAVENOUS at 22:21

## 2024-05-12 RX ADMIN — Medication 40 PERCENT: at 23:28

## 2024-05-12 RX ADMIN — Medication 40 PERCENT: at 21:12

## 2024-05-12 ASSESSMENT — COGNITIVE AND FUNCTIONAL STATUS - GENERAL
MOBILITY SCORE: 14
HELP NEEDED FOR BATHING: A LITTLE
HELP NEEDED FOR BATHING: A LITTLE
PERSONAL GROOMING: A LITTLE
DRESSING REGULAR UPPER BODY CLOTHING: A LITTLE
TOILETING: A LITTLE
WALKING IN HOSPITAL ROOM: TOTAL
STANDING UP FROM CHAIR USING ARMS: A LITTLE
TOILETING: A LITTLE
TURNING FROM BACK TO SIDE WHILE IN FLAT BAD: A LITTLE
DAILY ACTIVITIY SCORE: 22
MOVING FROM LYING ON BACK TO SITTING ON SIDE OF FLAT BED WITH BEDRAILS: A LITTLE
DAILY ACTIVITIY SCORE: 19
CLIMB 3 TO 5 STEPS WITH RAILING: TOTAL
DRESSING REGULAR LOWER BODY CLOTHING: A LITTLE
STANDING UP FROM CHAIR USING ARMS: A LITTLE
WALKING IN HOSPITAL ROOM: A LOT
CLIMB 3 TO 5 STEPS WITH RAILING: A LOT
MOBILITY SCORE: 16
MOVING TO AND FROM BED TO CHAIR: A LITTLE
MOVING TO AND FROM BED TO CHAIR: A LITTLE
MOVING FROM LYING ON BACK TO SITTING ON SIDE OF FLAT BED WITH BEDRAILS: A LITTLE
TURNING FROM BACK TO SIDE WHILE IN FLAT BAD: A LITTLE

## 2024-05-12 ASSESSMENT — PAIN SCALES - GENERAL: PAINLEVEL_OUTOF10: 0 - NO PAIN

## 2024-05-12 NOTE — SIGNIFICANT EVENT
Rapid Response     A rapid response was called for Mr Johnson d/t increasing tachypnea, supplemental O2 need, and SOB. On assessment he was oxygenating in the low 90s on 6L NC, on auscultation his breath sounds were positive for coarse crackles in his bilateral bases suspicious for pulmonary edema and the pt endorsed that he was having difficulty breathing. He had been given 20mg of IVP lasix prior which had not yielded any effective diuresis so he was given another 20mg of lasix IVP. His ABG showed PH 7.37, PaO2 256, and PCO2 29. He was placed on CPAP which lead to resolution of his dyspnea. CXR showed mild interstitial prominence, likely interstitial edema consistent with pulmonary edema. Given his history of BPH his bladder was scanned which demonstrated some retention. A valenzuela was placed for accurate I&Os.    Plan  - Additional 20mg IVP lasix x1  - CPAP for 2 hrs and reevaluate  - Valenzuela for accurate I&Os  - Supplemental O2 as needed to keep SpO2 above 92%    Primary team updated on events      I spent 20 minutes in the professional and overall care of this patient.

## 2024-05-12 NOTE — CARE PLAN
The patient's goals for the shift include no falls    The clinical goals for the shift include Pt will remain HDS    Problem: Safety - Adult  Goal: Free from fall injury  Outcome: Progressing     Problem: Discharge Planning  Goal: Discharge to home or other facility with appropriate resources  Outcome: Progressing     Problem: Chronic Conditions and Co-morbidities  Goal: Patient's chronic conditions and co-morbidity symptoms are monitored and maintained or improved  Outcome: Progressing     Problem: Skin  Goal: Decreased wound size/increased tissue granulation at next dressing change  Outcome: Progressing  Goal: Participates in plan/prevention/treatment measures  Outcome: Progressing  Goal: Prevent/manage excess moisture  Outcome: Progressing  Goal: Prevent/minimize sheer/friction injuries  Outcome: Progressing  Goal: Promote/optimize nutrition  Outcome: Progressing  Goal: Promote skin healing  Outcome: Progressing     Problem: Fall/Injury  Goal: Not fall by end of shift  Outcome: Progressing  Goal: Be free from injury by end of the shift  Outcome: Progressing  Goal: Verbalize understanding of personal risk factors for fall in the hospital  Outcome: Progressing  Goal: Verbalize understanding of risk factor reduction measures to prevent injury from fall in the home  Outcome: Progressing  Goal: Use assistive devices by end of the shift  Outcome: Progressing  Goal: Pace activities to prevent fatigue by end of the shift  Outcome: Progressing     Problem: Diabetes  Goal: Achieve decreasing blood glucose levels by end of shift  Outcome: Progressing  Goal: Increase stability of blood glucose readings by end of shift  Outcome: Progressing  Goal: Decrease in ketones present in urine by end of shift  Outcome: Progressing  Goal: Maintain electrolyte levels within acceptable range throughout shift  Outcome: Progressing  Goal: Maintain glucose levels >70mg/dl to <250mg/dl throughout shift  Outcome: Progressing  Goal: No changes  in neurological exam by end of shift  Outcome: Progressing  Goal: Learn about and adhere to nutrition recommendations by end of shift  Outcome: Progressing  Goal: Vital signs within normal range for age by end of shift  Outcome: Progressing  Goal: Increase self care and/or family involovement by end of shift  Outcome: Progressing  Goal: Receive DSME education by end of shift  Outcome: Progressing     Problem: Pain  Goal: Takes deep breaths with improved pain control throughout the shift  Outcome: Progressing  Goal: Turns in bed with improved pain control throughout the shift  Outcome: Progressing  Goal: Walks with improved pain control throughout the shift  Outcome: Progressing  Goal: Performs ADL's with improved pain control throughout shift  Outcome: Progressing  Goal: Participates in PT with improved pain control throughout the shift  Outcome: Progressing  Goal: Free from opioid side effects throughout the shift  Outcome: Progressing  Goal: Free from acute confusion related to pain meds throughout the shift  Outcome: Progressing

## 2024-05-12 NOTE — SIGNIFICANT EVENT
Pt was c/o dyspnea. Pt appeared tachypnic, RR 40's , increased WOB. Wheezing and crackles noted on auscultation. Pt was satting 87% on RA. Placed pt on 8L O2 and breathing tx given. After the breathing tx. Pt is still dyspnic and tachypnic and said his breathing is worst. RR was called. Pt placed on cpap +10 100% for his WOB per Dr. Stovall. ABG was obtained without any complications and result given to Dr. Stovall. Per ABG , fio2 on cpap was weaned to 40%. Pt satting 100%. Per team reassess pt's breathing  in 2 hours and see if he tolerate off cpap.      0325: Pt taken off from cpap. pt reported significant improvement on his breathing at this time. pt was placed on 6L at first and weaned to 2L with spo2 at 95%. RR 25, no respiratory distress noted, no wheezing. RN aware

## 2024-05-12 NOTE — CARE PLAN
The patient's goals for the shift include injury free    The clinical goals for the shift include remain falls free    Over the shift, the patient did make progress towards goals.

## 2024-05-12 NOTE — PROGRESS NOTES
Edward Johnson is a 89 y.o. male     Had rapid called last night because of worsening shortness of breath  Imaging showed congestive heart failure  Start IV fluids and given Lasix  Feels a little better took this morning  Kidney functions discussed with the family  The patient is not established with any nephrologist  Will get a consult in-house      Review of Systems     Constitutional: no fever, no chills, not feeling poorly, not feeling tired   Cardiovascular: no chest pain   Respiratory: Shortness of breath  Gastrointestinal: no abdominal pain, no constipation, no melena, no nausea, no diarrhea, no vomiting and no blood in stools.   Neurological: no headache,   All other systems have been reviewed and are negative for complaint.       Vitals:    05/12/24 1141   BP: 117/63   Pulse: 90   Resp: 20   Temp: 36.1 °C (97 °F)   SpO2: 95%        Scheduled medications  [Held by provider] amLODIPine, 10 mg, oral, Daily  cefTRIAXone, 1 g, intravenous, Daily  cyanocobalamin, 1,000 mcg, oral, Daily  metoprolol succinate XL, 50 mg, oral, Daily  oxygen, , inhalation, Continuous - Inhalation  oxygen, , inhalation, Continuous - Inhalation  pantoprazole, 40 mg, oral, Daily before breakfast   Or  pantoprazole, 40 mg, intravenous, Daily before breakfast  polyethylene glycol, 17 g, oral, Daily  simvastatin, 40 mg, oral, Nightly      Continuous medications  sodium chloride 0.9%, 75 mL/hr, Last Rate: Stopped (05/12/24 0010)      PRN medications  PRN medications: acetaminophen **OR** acetaminophen **OR** acetaminophen, guaiFENesin, ipratropium-albuteroL, melatonin, ondansetron **OR** ondansetron, polyethylene glycol, traMADol    Lab Review   Results from last 7 days   Lab Units 05/12/24  0641 05/11/24  0640 05/10/24  1440   WBC AUTO x10*3/uL 13.1* 7.9 9.6   HEMOGLOBIN g/dL 7.0* 7.0* 8.7*   HEMATOCRIT % 21.9* 21.7* 27.4*   PLATELETS AUTO x10*3/uL 162 145* 190     Results from last 7 days   Lab Units 05/12/24  0641 05/11/24  0640  05/10/24  1440   SODIUM mmol/L 136 139 138   POTASSIUM mmol/L 4.7 4.8 4.4   CHLORIDE mmol/L 106 110* 108*   CO2 mmol/L 20* 21 20*   BUN mg/dL 61* 60* 60*   CREATININE mg/dL 3.46* 3.27* 3.13*   CALCIUM mg/dL 8.8 8.3* 8.9   PROTEIN TOTAL g/dL  --   --  6.0*   BILIRUBIN TOTAL mg/dL  --   --  0.4   ALK PHOS U/L  --   --  66   ALT U/L  --   --  6*   AST U/L  --   --  12   GLUCOSE mg/dL 121* 116* 158*            XR chest 1 view   Final Result   1. Mild interstitial prominence, likely interstitial edema.        Signed by: Dayne Song 5/12/2024 2:35 AM   Dictation workstation:   [id number]      CT abdomen pelvis wo IV contrast   Final Result   Large  13.1 x 5.6 x 5.5 cm subcutaneous hematoma over the lateral   aspect of the right hip.  Interstitial hemorrhage is seen throughout   the subcutaneous tissues surrounding the hematoma.   Status post TAPVR and CABG with calcification of the native coronary   arteries and mitral annulus.   Multiple subtle soft tissue densities seen on the lateral wall.    Although this could be sediment, the possibility of polypoid lesions   must be excluded.  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   Prostatic enlargement impinging on the bladder base.   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   Left hydrocele.   Right inguinal hernia containing fluid.   Signed by Nikunj Port      XR hip right with pelvis when performed 2 or 3 views   Final Result   No acute bony injury demonstrated..   Signed by Nikunj Port      XR femur right 2+ views   Final Result   No acute injury demonstrated..   Signed by deeplocal            Physical Exam     Constitutional   General appearance: Alert and in no acute distress.     Pulmonary   Respiratory assessment: No respiratory distress, normal respiratory rhythm and effort.     Auscultation of Lungs: Clear bilateral breath sounds.   Cardiovascular   Auscultation of heart: Apical pulse normal, heart rate and rhythm normal, normal S1 and S2, no murmurs and no pericardial rub.    Exam for edema: No peripheral edema.   Abdomen   Abdominal Exam: No bruits, normal bowel sounds, soft, non-tender, no abdominal mass palpated.    Liver and Spleen exam: No hepato-splenomegaly.   Musculoskeletal     Skin inspection: Large hematoma right hip area  Neurologic   Cranial nerves: Nerves 2-12 were intact, no focal neuro defects.     Assessment/Plan      #Blood loss anemia  #Large hematoma on right hip  Patient is Islam  Stable post IV iron  Monitor hemoglobin hematocrit    #Hematuria, chronic per family  ?  UTI  Continue IV Rocephin    #Orthostatic hypotension  #Acute CHF  Stopped IV fluids  Given Lasix x 1    #CKD 4  We will get nephrology on board as he is not established with anyone outside    #Hypertension  Hold medications    #Dyslipidemia  Continue statins    PT OT eval pending

## 2024-05-12 NOTE — NURSING NOTE
RN was alerted to pt having a low spO2 and audible wheezes by the PCNA at 2348. Upon RN examination pt was also tachypnic. RT was called to assess. DR Ash was called and pt was given 20mg IVP of lasix and a breathing treatment. Afterwards pt stated that he felt worse and there was no improvement in vitals. Rapid response was called at 0038. Pt was given another 20 mg IVP lasix and placed on a bipap. CXR was obtained. A valenzuela was ordered due to pt having 536 mL in his bladder and no urge to urinate. Pt stating at 100% and RR 28.

## 2024-05-13 ENCOUNTER — HOSPITAL ENCOUNTER (INPATIENT)
Facility: HOSPITAL | Age: 89
LOS: 2 days | DRG: 951 | End: 2024-05-15
Attending: INTERNAL MEDICINE | Admitting: INTERNAL MEDICINE
Payer: OTHER MISCELLANEOUS

## 2024-05-13 VITALS
BODY MASS INDEX: 27.88 KG/M2 | OXYGEN SATURATION: 92 % | SYSTOLIC BLOOD PRESSURE: 87 MMHG | DIASTOLIC BLOOD PRESSURE: 61 MMHG | HEIGHT: 65 IN | TEMPERATURE: 97.1 F | RESPIRATION RATE: 30 BRPM | WEIGHT: 167.33 LBS | HEART RATE: 104 BPM

## 2024-05-13 DIAGNOSIS — Z51.5 ENCOUNTER FOR PALLIATIVE CARE: ICD-10-CM

## 2024-05-13 LAB
ANION GAP SERPL CALC-SCNC: 24 MMOL/L (ref 10–20)
BUN SERPL-MCNC: 67 MG/DL (ref 6–23)
CALCIUM SERPL-MCNC: 8.5 MG/DL (ref 8.6–10.3)
CHLORIDE SERPL-SCNC: 105 MMOL/L (ref 98–107)
CO2 SERPL-SCNC: 12 MMOL/L (ref 21–32)
CREAT SERPL-MCNC: 4 MG/DL (ref 0.5–1.3)
D DIMER PPP FEU-MCNC: 1437 NG/ML FEU
EGFRCR SERPLBLD CKD-EPI 2021: 13 ML/MIN/1.73M*2
ERYTHROCYTE [DISTWIDTH] IN BLOOD BY AUTOMATED COUNT: 14.3 % (ref 11.5–14.5)
GLUCOSE SERPL-MCNC: 227 MG/DL (ref 74–99)
HCT VFR BLD AUTO: 26.3 % (ref 41–52)
HGB BLD-MCNC: 7.5 G/DL (ref 13.5–17.5)
MCH RBC QN AUTO: 31.1 PG (ref 26–34)
MCHC RBC AUTO-ENTMCNC: 28.5 G/DL (ref 32–36)
MCV RBC AUTO: 109 FL (ref 80–100)
NRBC BLD-RTO: 0.8 /100 WBCS (ref 0–0)
PLATELET # BLD AUTO: 163 X10*3/UL (ref 150–450)
POTASSIUM SERPL-SCNC: 4.8 MMOL/L (ref 3.5–5.3)
PSA FREE MFR SERPL: 50 %
PSA FREE SERPL-MCNC: 0.2 NG/ML
PSA SERPL IA-MCNC: 0.4 NG/ML (ref 0–4)
RBC # BLD AUTO: 2.41 X10*6/UL (ref 4.5–5.9)
SODIUM SERPL-SCNC: 136 MMOL/L (ref 136–145)
WBC # BLD AUTO: 13.8 X10*3/UL (ref 4.4–11.3)

## 2024-05-13 PROCEDURE — 2500000005 HC RX 250 GENERAL PHARMACY W/O HCPCS: Performed by: EMERGENCY MEDICINE

## 2024-05-13 PROCEDURE — 2500000002 HC RX 250 W HCPCS SELF ADMINISTERED DRUGS (ALT 637 FOR MEDICARE OP, ALT 636 FOR OP/ED): Performed by: INTERNAL MEDICINE

## 2024-05-13 PROCEDURE — 2500000004 HC RX 250 GENERAL PHARMACY W/ HCPCS (ALT 636 FOR OP/ED): Performed by: INTERNAL MEDICINE

## 2024-05-13 PROCEDURE — 1150000001 HC HOSPICE PRIVATE ROOM DAILY

## 2024-05-13 PROCEDURE — 99233 SBSQ HOSP IP/OBS HIGH 50: CPT | Performed by: INTERNAL MEDICINE

## 2024-05-13 PROCEDURE — 94640 AIRWAY INHALATION TREATMENT: CPT

## 2024-05-13 PROCEDURE — C9113 INJ PANTOPRAZOLE SODIUM, VIA: HCPCS | Performed by: INTERNAL MEDICINE

## 2024-05-13 PROCEDURE — 85027 COMPLETE CBC AUTOMATED: CPT | Performed by: INTERNAL MEDICINE

## 2024-05-13 PROCEDURE — 2500000004 HC RX 250 GENERAL PHARMACY W/ HCPCS (ALT 636 FOR OP/ED): Performed by: NURSE PRACTITIONER

## 2024-05-13 PROCEDURE — 94664 DEMO&/EVAL PT USE INHALER: CPT

## 2024-05-13 PROCEDURE — 94660 CPAP INITIATION&MGMT: CPT

## 2024-05-13 PROCEDURE — 36415 COLL VENOUS BLD VENIPUNCTURE: CPT | Performed by: INTERNAL MEDICINE

## 2024-05-13 PROCEDURE — 80048 BASIC METABOLIC PNL TOTAL CA: CPT | Performed by: INTERNAL MEDICINE

## 2024-05-13 PROCEDURE — 85379 FIBRIN DEGRADATION QUANT: CPT | Performed by: INTERNAL MEDICINE

## 2024-05-13 RX ORDER — HALOPERIDOL 5 MG/ML
1 INJECTION INTRAMUSCULAR EVERY 4 HOURS PRN
Status: CANCELLED | OUTPATIENT
Start: 2024-05-13

## 2024-05-13 RX ORDER — HYDROMORPHONE HYDROCHLORIDE 0.2 MG/ML
0.2 INJECTION INTRAMUSCULAR; INTRAVENOUS; SUBCUTANEOUS
Status: DISCONTINUED | OUTPATIENT
Start: 2024-05-13 | End: 2024-05-13 | Stop reason: HOSPADM

## 2024-05-13 RX ORDER — ACETAMINOPHEN 650 MG/1
650 SUPPOSITORY RECTAL EVERY 4 HOURS PRN
Status: DISCONTINUED | OUTPATIENT
Start: 2024-05-13 | End: 2024-05-15 | Stop reason: HOSPADM

## 2024-05-13 RX ORDER — LORAZEPAM 2 MG/ML
0.5 INJECTION INTRAMUSCULAR EVERY 4 HOURS PRN
Status: DISCONTINUED | OUTPATIENT
Start: 2024-05-13 | End: 2024-05-15 | Stop reason: HOSPADM

## 2024-05-13 RX ORDER — ONDANSETRON HYDROCHLORIDE 2 MG/ML
4 INJECTION, SOLUTION INTRAVENOUS EVERY 8 HOURS PRN
Status: CANCELLED | OUTPATIENT
Start: 2024-05-13

## 2024-05-13 RX ORDER — LORAZEPAM 2 MG/ML
0.5 INJECTION INTRAMUSCULAR EVERY 6 HOURS PRN
Status: DISCONTINUED | OUTPATIENT
Start: 2024-05-13 | End: 2024-05-13

## 2024-05-13 RX ORDER — LORAZEPAM 2 MG/ML
0.5 INJECTION INTRAMUSCULAR EVERY 4 HOURS PRN
Status: DISCONTINUED | OUTPATIENT
Start: 2024-05-13 | End: 2024-05-13 | Stop reason: HOSPADM

## 2024-05-13 RX ORDER — ONDANSETRON HYDROCHLORIDE 2 MG/ML
4 INJECTION, SOLUTION INTRAVENOUS EVERY 8 HOURS PRN
Status: DISCONTINUED | OUTPATIENT
Start: 2024-05-13 | End: 2024-05-15 | Stop reason: HOSPADM

## 2024-05-13 RX ORDER — ALBUTEROL SULFATE 0.83 MG/ML
2.5 SOLUTION RESPIRATORY (INHALATION) EVERY 2 HOUR PRN
Status: DISCONTINUED | OUTPATIENT
Start: 2024-05-13 | End: 2024-05-15 | Stop reason: HOSPADM

## 2024-05-13 RX ORDER — ALBUTEROL SULFATE 0.83 MG/ML
2.5 SOLUTION RESPIRATORY (INHALATION) EVERY 2 HOUR PRN
Status: DISCONTINUED | OUTPATIENT
Start: 2024-05-13 | End: 2024-05-13 | Stop reason: HOSPADM

## 2024-05-13 RX ORDER — ALBUTEROL SULFATE 0.83 MG/ML
2.5 SOLUTION RESPIRATORY (INHALATION) EVERY 2 HOUR PRN
Status: CANCELLED | OUTPATIENT
Start: 2024-05-13

## 2024-05-13 RX ORDER — LORAZEPAM 2 MG/ML
0.5 INJECTION INTRAMUSCULAR EVERY 4 HOURS PRN
Status: CANCELLED | OUTPATIENT
Start: 2024-05-13

## 2024-05-13 RX ORDER — HYDROMORPHONE HYDROCHLORIDE 0.2 MG/ML
0.2 INJECTION INTRAMUSCULAR; INTRAVENOUS; SUBCUTANEOUS
Status: CANCELLED | OUTPATIENT
Start: 2024-05-13

## 2024-05-13 RX ORDER — AMLODIPINE BESYLATE 10 MG/1
10 TABLET ORAL DAILY
Status: CANCELLED | OUTPATIENT
Start: 2024-05-14

## 2024-05-13 RX ORDER — AMLODIPINE BESYLATE 10 MG/1
10 TABLET ORAL DAILY
Status: DISCONTINUED | OUTPATIENT
Start: 2024-05-14 | End: 2024-05-15 | Stop reason: HOSPADM

## 2024-05-13 RX ORDER — POLYETHYLENE GLYCOL 3350 17 G/17G
17 POWDER, FOR SOLUTION ORAL DAILY
Status: DISCONTINUED | OUTPATIENT
Start: 2024-05-14 | End: 2024-05-15 | Stop reason: HOSPADM

## 2024-05-13 RX ORDER — HYDROMORPHONE HYDROCHLORIDE 0.2 MG/ML
0.2 INJECTION INTRAMUSCULAR; INTRAVENOUS; SUBCUTANEOUS EVERY 4 HOURS
Status: CANCELLED | OUTPATIENT
Start: 2024-05-13

## 2024-05-13 RX ORDER — LORAZEPAM 0.5 MG/1
0.5 TABLET ORAL EVERY 6 HOURS PRN
Status: DISCONTINUED | OUTPATIENT
Start: 2024-05-13 | End: 2024-05-13

## 2024-05-13 RX ORDER — HALOPERIDOL 5 MG/ML
1 INJECTION INTRAMUSCULAR EVERY 4 HOURS PRN
Status: DISCONTINUED | OUTPATIENT
Start: 2024-05-13 | End: 2024-05-15 | Stop reason: HOSPADM

## 2024-05-13 RX ORDER — HALOPERIDOL 5 MG/ML
1 INJECTION INTRAMUSCULAR EVERY 4 HOURS PRN
Status: DISCONTINUED | OUTPATIENT
Start: 2024-05-13 | End: 2024-05-13 | Stop reason: HOSPADM

## 2024-05-13 RX ORDER — HYDROMORPHONE HYDROCHLORIDE 0.2 MG/ML
0.2 INJECTION INTRAMUSCULAR; INTRAVENOUS; SUBCUTANEOUS EVERY 4 HOURS
Status: DISCONTINUED | OUTPATIENT
Start: 2024-05-13 | End: 2024-05-13 | Stop reason: HOSPADM

## 2024-05-13 RX ORDER — ACETAMINOPHEN 650 MG/1
650 SUPPOSITORY RECTAL EVERY 4 HOURS PRN
Status: DISCONTINUED | OUTPATIENT
Start: 2024-05-13 | End: 2024-05-13 | Stop reason: HOSPADM

## 2024-05-13 RX ORDER — POLYETHYLENE GLYCOL 3350 17 G/17G
17 POWDER, FOR SOLUTION ORAL DAILY
Status: CANCELLED | OUTPATIENT
Start: 2024-05-14

## 2024-05-13 RX ORDER — ACETAMINOPHEN 650 MG/1
650 SUPPOSITORY RECTAL EVERY 4 HOURS PRN
Status: CANCELLED | OUTPATIENT
Start: 2024-05-13

## 2024-05-13 RX ORDER — ALBUTEROL SULFATE 0.83 MG/ML
2.5 SOLUTION RESPIRATORY (INHALATION) EVERY 4 HOURS PRN
Status: DISCONTINUED | OUTPATIENT
Start: 2024-05-13 | End: 2024-05-13

## 2024-05-13 RX ADMIN — PANTOPRAZOLE SODIUM 40 MG: 40 INJECTION, POWDER, FOR SOLUTION INTRAVENOUS at 06:07

## 2024-05-13 RX ADMIN — IPRATROPIUM BROMIDE AND ALBUTEROL SULFATE 3 ML: 2.5; .5 SOLUTION RESPIRATORY (INHALATION) at 07:28

## 2024-05-13 RX ADMIN — HYDROMORPHONE HYDROCHLORIDE 0.2 MG: 0.2 INJECTION, SOLUTION INTRAMUSCULAR; INTRAVENOUS; SUBCUTANEOUS at 12:12

## 2024-05-13 RX ADMIN — Medication 6 L/MIN: at 07:28

## 2024-05-13 RX ADMIN — HYDROMORPHONE HYDROCHLORIDE 0.2 MG: 0.2 INJECTION, SOLUTION INTRAMUSCULAR; INTRAVENOUS; SUBCUTANEOUS at 17:39

## 2024-05-13 RX ADMIN — HYDROMORPHONE HYDROCHLORIDE 0.4 MG: 1 INJECTION, SOLUTION INTRAMUSCULAR; INTRAVENOUS; SUBCUTANEOUS at 13:18

## 2024-05-13 RX ADMIN — CEFTRIAXONE SODIUM 1 G: 1 INJECTION, SOLUTION INTRAVENOUS at 08:56

## 2024-05-13 RX ADMIN — LORAZEPAM 0.5 MG: 2 INJECTION INTRAMUSCULAR; INTRAVENOUS at 08:56

## 2024-05-13 ASSESSMENT — RESPIRATORY DISTRESS OBSERVATION SCALE (RDOS)
RESTLESS NONPURPOSEFUL MOVEMENTS: 0 - NONE
PARADOXICAL BREATHING PATTERN: 0 - NONE
GRUNTING AT END OF EXPIRATION: 0 - NONE
GRUNTING AT END OF EXPIRATION: 0 - NONE
PARADOXICAL BREATHING PATTERN: 0 - NONE
RESPIRATORY RATE PER MINUTE: 2 - >30 BREATHS
INVOLUNTARY NASAL FLARING: 0 - NONE
GRUNTING AT END OF EXPIRATION: 0 - NONE
RESTLESS NONPURPOSEFUL MOVEMENTS: 0 - NONE
RESPIRATORY RATE PER MINUTE: 1 - 19-30 BREATHS
INVOLUNTARY NASAL FLARING: 0 - NONE
LOOK OF FEAR: 0 - NONE
HEART RATE PER MINUTE: 2 - >109 BEATS
LOOK OF FEAR: 0 - NONE
GRUNTING AT END OF EXPIRATION: 0 - NONE
HEART RATE PER MINUTE: 2 - >109 BEATS
LOOK OF FEAR: 0 - NONE
ACCESSORY MUSCLE RISE IN CLAVICLE DURING INSPIRATION: 1 - SLIGHT RISE
RDOS TOTAL SCORE: 5
HEART RATE PER MINUTE: 2 - >109 BEATS
INVOLUNTARY NASAL FLARING: 0 - NONE
HEART RATE PER MINUTE: 0 - <90 BEATS
PARADOXICAL BREATHING PATTERN: 0 - NONE
RESPIRATORY RATE PER MINUTE: 2 - >30 BREATHS
LOOK OF FEAR: 0 - NONE
RESTLESS NONPURPOSEFUL MOVEMENTS: 0 - NONE
RESPIRATORY RATE PER MINUTE: 2 - >30 BREATHS
ACCESSORY MUSCLE RISE IN CLAVICLE DURING INSPIRATION: 1 - SLIGHT RISE
RDOS TOTAL SCORE: 5
INVOLUNTARY NASAL FLARING: 0 - NONE
ACCESSORY MUSCLE RISE IN CLAVICLE DURING INSPIRATION: 0 - NONE
ACCESSORY MUSCLE RISE IN CLAVICLE DURING INSPIRATION: 1 - SLIGHT RISE
PARADOXICAL BREATHING PATTERN: 0 - NONE
RESTLESS NONPURPOSEFUL MOVEMENTS: 0 - NONE
RDOS TOTAL SCORE: 5
RDOS TOTAL SCORE: 1

## 2024-05-13 ASSESSMENT — COGNITIVE AND FUNCTIONAL STATUS - GENERAL
STANDING UP FROM CHAIR USING ARMS: A LITTLE
DAILY ACTIVITIY SCORE: 19
DRESSING REGULAR UPPER BODY CLOTHING: A LITTLE
DAILY ACTIVITIY SCORE: 19
WALKING IN HOSPITAL ROOM: TOTAL
MOBILITY SCORE: 14
MOVING TO AND FROM BED TO CHAIR: A LITTLE
PERSONAL GROOMING: A LITTLE
TOILETING: A LITTLE
PERSONAL GROOMING: A LITTLE
STANDING UP FROM CHAIR USING ARMS: A LITTLE
HELP NEEDED FOR BATHING: A LITTLE
DRESSING REGULAR LOWER BODY CLOTHING: A LITTLE
DRESSING REGULAR LOWER BODY CLOTHING: A LITTLE
MOVING FROM LYING ON BACK TO SITTING ON SIDE OF FLAT BED WITH BEDRAILS: A LITTLE
WALKING IN HOSPITAL ROOM: TOTAL
DRESSING REGULAR UPPER BODY CLOTHING: A LITTLE
MOVING TO AND FROM BED TO CHAIR: A LITTLE
MOBILITY SCORE: 14
TURNING FROM BACK TO SIDE WHILE IN FLAT BAD: A LITTLE
CLIMB 3 TO 5 STEPS WITH RAILING: TOTAL
TURNING FROM BACK TO SIDE WHILE IN FLAT BAD: A LITTLE
CLIMB 3 TO 5 STEPS WITH RAILING: TOTAL
TOILETING: A LITTLE
MOVING FROM LYING ON BACK TO SITTING ON SIDE OF FLAT BED WITH BEDRAILS: A LITTLE
HELP NEEDED FOR BATHING: A LITTLE

## 2024-05-13 ASSESSMENT — PAIN SCALES - GENERAL
PAINLEVEL_OUTOF10: 0 - NO PAIN
PAINLEVEL_OUTOF10: 0 - NO PAIN

## 2024-05-13 ASSESSMENT — PAIN - FUNCTIONAL ASSESSMENT
PAIN_FUNCTIONAL_ASSESSMENT: 0-10
PAIN_FUNCTIONAL_ASSESSMENT: 0-10

## 2024-05-13 NOTE — NURSING NOTE
RN Hospice Note    Edward Johnson is a Hospice Patient.   Hospice terminal diagnosis: Multi organ system failure  Physician: Dr. Ash  Visit type: Admission - [facility] day 1    Comments/recommendations: This RN present to complete hospice admission assessment.  Pts dtr/poa ermelinda and her spouse stevenson at bedside.  Pt awake, lethargic, minimally participates in discussion/assessment.  Pt denies pain, unable to elaborate/rate sob at rest, visibly tachypneic and with increased sx's labored breathing.  Pt denies n/v and constipation.  Pts family confirmed goc/poc/code status, desire only comfort care at [facility] at this time.  This RN reviewed [facility] LOC at [facility], aware this is short term los, is collaborative care, [facility] staff provide 24 hr care, hospice staff visit daily.  Family aware if pt stabilizes/lingers, may need discharge from [facility] at [facility], dtr Ermelinda stated they may consider to take pt home or possibly ipu.  D/w dr. Ash and [facility] staff.  ZION Blanco will complete the discharge and readmit orders for [facility].  This RN faxed [facility] reservation form to ED admitting/registration.  [facility] will follow up with daily visits while in [facility].  Thanks for the consult and collaborative care of this pt and family.     Discharge Planning:  Patient to be discharged to [facility]    Plan of care reviewed with patient/family members Ermelinda, dtr/pcg/miles and her spouse Stevenson, their son Carlton   Plan of care reviewed with hospital staff members: Dr. Ash/ZION Fisher/AJ Yusuf/Susana Phelan RN/Sheila Beltre RN CC     Please notify [facility] of any changes in condition. Thank you.  Office: [phone] (8 am-6:30 pm M-F and 8 am-4:30 pm weekends and holidays)   [phone] (6:30 pm-8 am M-F and 4:30 pm-8 am weekends and holidays)    Denisa Thompson RN

## 2024-05-13 NOTE — CARE PLAN
The patient's goals for the shift include free of falls    The clinical goals for the shift include respiration will be improved

## 2024-05-13 NOTE — PROGRESS NOTES
05/13/24 1245   Discharge Planning   Patient expects to be discharged to: notified by CNP-APRN; patient daughter is agreeable to  start comfort measures-hospice -Palliative SW following   Does the patient need discharge transport arranged? Yes   RoundTrip coordination needed? Yes   Has discharge transport been arranged? No

## 2024-05-13 NOTE — PROGRESS NOTES
SW Hospice Note    Edward Johnson is NOT a Hospice Patient.   Hospice terminal diagnosis: Cerebrovascular disease, acute respiratory failure  Physician: Dr. Marybeth Ash  Visit type: Liaison SW and [facility] Admission Assistant    Comments/recommendations: This LSW and AA Mira Manjarrez met with DTR Ermelinda and her  Angel at bedside. Patient unable to participate in conversation. Patient visibly short of breath and nonresponsive. This LSW along with AA reviewed hospice services, supports and levels of care. Goals of care discussed.  Family verbalizing understanding to hospice services and consents signed with goal for [facility] RN assessment for GIP admission today.     Discharge Planning:  Patient to be discharged to [facility] if stable enough to discharge after GIP admission    The following is to be completed:  Discharge order: NA  State DNR signed by MD: DEONDRE  Nursing facility referral/transfer form: DEONDRE  Medication reconciliation: NA  PAS/RR or convalescent stay form: NA  Prescriptions for al narcotics/new medications: NA  Transportation: NA  Other: NA    Plan of care reviewed with patient/family members: RADHA Wadsworth and KIARRA Ortiz present  Plan of care reviewed with hospital staff members: BEBO Blanco, CNP Tracie Matias    Please notify [facility] of any changes in condition. Thank you.  Office: [phone] (8 am-6:30 pm M-F and 8 am-4:30 pm weekends and holidays)   [phone] (6:30 pm-8 am M-F and 4:30 pm-8 am weekends and holidays)    KEEGAN Franz

## 2024-05-13 NOTE — PROGRESS NOTES
PALLIATIVE CARE SOCIAL WORK NOTE     Hospice order received this morning. Pt is a 89yr old male admitted 5/10. I met with daughter Ermelinda at bedside this morning. Per Ermelinda, pt has lived with her since his wife passed 4 years ago. She says that he has been relatively independent, never wanting to be a burden to anyone. He sustained a fall on Thursday, in which he injured his hip, but did not want to come to the hospital. He finally agreed to come to the ED the following day. He is now having significant shortness of breath. GIULIANA Marie spoke with Ermelinda and she is interested in involving hospice. GIULIANA Marie feels that he is GIP appropriate. Offered choice of hospice agency. Ermelinda familiar with [facility]. Referral has been made to [facility] via [facility]. HWR's  Mira and HWR AJ Kingsley to meet with pt/family this afternoon. H[facility] RN to see as well. Will continue to follow as appropriate. Thank you.    TOY Yusuf

## 2024-05-13 NOTE — PROGRESS NOTES
Edward Johnson is a 89 y.o. male     Patient's condition got worse through the night  Increasing tachycardia and tachypnea  Checked a D-dimer which is elevated  Worsening renal functions  Discussed with the patient's daughter at bedside  Family requests hospice consult  Hospice consult placed      Review of Systems     Constitutional: no fever, no chills, not feeling poorly, not feeling tired   Cardiovascular: no chest pain   Respiratory: Shortness of breath  Gastrointestinal: no abdominal pain, no constipation, no melena, no nausea, no diarrhea, no vomiting and no blood in stools.   Neurological: no headache,   All other systems have been reviewed and are negative for complaint.       Vitals:    05/13/24 0823   BP: (!) 85/49   Pulse: (!) 115   Resp: (!) 28   Temp: 36.3 °C (97.4 °F)   SpO2: 94%        Scheduled medications  [Held by provider] amLODIPine, 10 mg, oral, Daily  cefTRIAXone, 1 g, intravenous, Daily  cyanocobalamin, 1,000 mcg, oral, Daily  metoprolol succinate XL, 50 mg, oral, Daily  oxygen, , inhalation, Continuous - Inhalation  pantoprazole, 40 mg, oral, Daily before breakfast  polyethylene glycol, 17 g, oral, Daily  simvastatin, 40 mg, oral, Nightly      Continuous medications       PRN medications  PRN medications: acetaminophen **OR** [DISCONTINUED] acetaminophen **OR** [DISCONTINUED] acetaminophen, guaiFENesin, ipratropium-albuteroL, LORazepam **OR** [DISCONTINUED] LORazepam, melatonin, [DISCONTINUED] ondansetron **OR** ondansetron, oxygen, polyethylene glycol, traMADol    Lab Review   Results from last 7 days   Lab Units 05/13/24  0854 05/12/24  0641 05/11/24  0640   WBC AUTO x10*3/uL 13.8* 13.1* 7.9   HEMOGLOBIN g/dL 7.5* 7.0* 7.0*   HEMATOCRIT % 26.3* 21.9* 21.7*   PLATELETS AUTO x10*3/uL 163 162 145*     Results from last 7 days   Lab Units 05/13/24  0854 05/12/24  0641 05/11/24  0640 05/10/24  1440   SODIUM mmol/L 136 136 139 138   POTASSIUM mmol/L 4.8 4.7 4.8 4.4   CHLORIDE mmol/L 105 106  110* 108*   CO2 mmol/L 12* 20* 21 20*   BUN mg/dL 67* 61* 60* 60*   CREATININE mg/dL 4.00* 3.46* 3.27* 3.13*   CALCIUM mg/dL 8.5* 8.8 8.3* 8.9   PROTEIN TOTAL g/dL  --   --   --  6.0*   BILIRUBIN TOTAL mg/dL  --   --   --  0.4   ALK PHOS U/L  --   --   --  66   ALT U/L  --   --   --  6*   AST U/L  --   --   --  12   GLUCOSE mg/dL 227* 121* 116* 158*            XR chest 1 view   Final Result   1. Mild interstitial prominence, likely interstitial edema.        Signed by: Dayne Song 5/12/2024 2:35 AM   Dictation workstation:   [id number]      CT abdomen pelvis wo IV contrast   Final Result   Large  13.1 x 5.6 x 5.5 cm subcutaneous hematoma over the lateral   aspect of the right hip.  Interstitial hemorrhage is seen throughout   the subcutaneous tissues surrounding the hematoma.   Status post TAPVR and CABG with calcification of the native coronary   arteries and mitral annulus.   Multiple subtle soft tissue densities seen on the lateral wall.    Although this could be sediment, the possibility of polypoid lesions   must be excluded.  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   Prostatic enlargement impinging on the bladder base.   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   Left hydrocele.   Right inguinal hernia containing fluid.   Signed by Nikunj Chappell      XR hip right with pelvis when performed 2 or 3 views   Final Result   No acute bony injury demonstrated..   Signed by Nikunj Chappell      XR femur right 2+ views   Final Result   No acute injury demonstrated..   Signed by Nikunj Chappell      Transthoracic Echo (TTE) Complete    (Results Pending)         Physical Exam     Constitutional   General appearance: Alert and in no acute distress.     Pulmonary   Respiratory assessment: No respiratory distress, normal respiratory rhythm and  effort.    Auscultation of Lungs: Clear bilateral breath sounds.   Cardiovascular   Auscultation of heart: Apical pulse normal, heart rate and rhythm normal, normal S1 and S2, no murmurs and no pericardial rub.    Exam for edema: No peripheral edema.   Abdomen   Abdominal Exam: No bruits, normal bowel sounds, soft, non-tender, no abdominal mass palpated.    Liver and Spleen exam: No hepato-splenomegaly.   Musculoskeletal     Skin inspection: Large hematoma right hip area  Neurologic   Cranial nerves: Nerves 2-12 were intact, no focal neuro defects.     Assessment/Plan      #Acute respiratory failure with hypoxia  #Tachypnea/tachycardia  Elevated D-dimer raises the possibility of pulmonary embolism  Would be high risk to start blood thinners because of the blood loss anemia from large hematoma and the patient being a Caodaism  This was discussed with the patient's daughter  We have consulted hospice    #Blood loss anemia  #Large hematoma on right hip  Patient is Caodaism  Stable post IV iron  Monitor hemoglobin hematocrit    # Urinary tract infection  Continue Rocephin    #Orthostatic hypotension  #Acute CHF  Given Lasix x 1    #CKD 4  Worsening renal functions  Nephrology was consulted    #Hypertension  Hold medications    #Dyslipidemia  Continue statins

## 2024-05-13 NOTE — PROGRESS NOTES
S:  Met with patients daughter, Ermelinda, at bedside.  We discussed her dad's current conditions in addition to his chronic medical problems.  She shared her concern with his significant decline. We discussed hospice, comfort care and next steps.  She would like to proceed with DNRCC and hospice    O:  On exam, patient is an elderly male, A&O to self, labored breathing, bedside pulse Ox 81% on NC.      A/P:  Edward Johnson is a 89 year old male with CVA with right-sided hemiparesis hypertension dyslipidemia osteoarthritis was doing well he had an accidental fall while in the bathroom.  Admitted s/p mechanical fall, found to have acute hypoxic resp failure (d/dx PE), acute blood loss anemia 2/2 large R hip hematoma. UTI, CKD 4.  Patient is Zoroastrian, if acute PE, would not be able to start AC due to hematoma/anemia.  Today patient is in respiratory distress with labored breathing and hypoxia.      - Consult Hospice (SW and H[facility] SW aware, will need inpatient GIP here or at facility)  - Add comfort meds  - no further testing or lab work.   - change to DNR Comfort Care    Tracie Matias, GIULIANA  Internal Medicine

## 2024-05-13 NOTE — NURSING NOTE
Dr Ash notified of pt increased anxiety and SOB, no new order at this time. New order to discontinue telemetry monitoring

## 2024-05-14 LAB
ANION GAP BLDA CALCULATED.4IONS-SCNC: 14 MMO/L (ref 10–25)
BASE EXCESS BLDA CALC-SCNC: -7.7 MMOL/L (ref -2–3)
BODY TEMPERATURE: 37 DEGREES CELSIUS
CA-I BLDA-SCNC: 1.2 MMOL/L (ref 1.1–1.33)
CHLORIDE BLDA-SCNC: 107 MMOL/L (ref 98–107)
GLUCOSE BLDA-MCNC: 205 MG/DL (ref 74–99)
HCO3 BLDA-SCNC: 16.8 MMOL/L (ref 22–26)
HCT VFR BLD EST: 22 % (ref 41–52)
HGB BLDA-MCNC: 7.3 G/DL (ref 13.5–17.5)
INHALED O2 CONCENTRATION: 45 %
LACTATE BLDA-SCNC: 2.1 MMOL/L (ref 0.4–2)
OXYHGB MFR BLDA: 98 % (ref 94–98)
PCO2 BLDA: 29 MM HG (ref 38–42)
PH BLDA: 7.37 PH (ref 7.38–7.42)
PO2 BLDA: 256 MM HG (ref 85–95)
POTASSIUM BLDA-SCNC: 4.7 MMOL/L (ref 3.5–5.3)
SAO2 % BLDA: 100 % (ref 94–100)
SODIUM BLDA-SCNC: 133 MMOL/L (ref 136–145)

## 2024-05-14 PROCEDURE — 99222 1ST HOSP IP/OBS MODERATE 55: CPT | Performed by: INTERNAL MEDICINE

## 2024-05-14 PROCEDURE — 2500000004 HC RX 250 GENERAL PHARMACY W/ HCPCS (ALT 636 FOR OP/ED): Performed by: NURSE PRACTITIONER

## 2024-05-14 PROCEDURE — 1150000001 HC HOSPICE PRIVATE ROOM DAILY

## 2024-05-14 RX ORDER — FAMOTIDINE 20 MG/1
10 TABLET, FILM COATED ORAL DAILY
Status: DISCONTINUED | OUTPATIENT
Start: 2024-05-14 | End: 2024-05-15 | Stop reason: HOSPADM

## 2024-05-14 RX ORDER — ASPIRIN 81 MG/1
81 TABLET ORAL DAILY
Status: DISCONTINUED | OUTPATIENT
Start: 2024-05-14 | End: 2024-05-15 | Stop reason: HOSPADM

## 2024-05-14 RX ORDER — SIMVASTATIN 40 MG/1
40 TABLET, FILM COATED ORAL NIGHTLY
Status: DISCONTINUED | OUTPATIENT
Start: 2024-05-14 | End: 2024-05-15 | Stop reason: HOSPADM

## 2024-05-14 RX ORDER — FAMOTIDINE 20 MG/1
20 TABLET, FILM COATED ORAL DAILY
Status: DISCONTINUED | OUTPATIENT
Start: 2024-05-14 | End: 2024-05-14

## 2024-05-14 RX ADMIN — HYDROMORPHONE HYDROCHLORIDE 0.2 MG: 0.2 INJECTION, SOLUTION INTRAMUSCULAR; INTRAVENOUS; SUBCUTANEOUS at 08:11

## 2024-05-14 RX ADMIN — LORAZEPAM 0.5 MG: 2 INJECTION INTRAMUSCULAR; INTRAVENOUS at 03:29

## 2024-05-14 RX ADMIN — HYDROMORPHONE HYDROCHLORIDE 0.2 MG: 0.2 INJECTION, SOLUTION INTRAMUSCULAR; INTRAVENOUS; SUBCUTANEOUS at 00:00

## 2024-05-14 RX ADMIN — HYDROMORPHONE HYDROCHLORIDE 0.2 MG: 0.2 INJECTION, SOLUTION INTRAMUSCULAR; INTRAVENOUS; SUBCUTANEOUS at 12:02

## 2024-05-14 RX ADMIN — HYDROMORPHONE HYDROCHLORIDE 0.2 MG: 0.2 INJECTION, SOLUTION INTRAMUSCULAR; INTRAVENOUS; SUBCUTANEOUS at 03:29

## 2024-05-14 RX ADMIN — HYDROMORPHONE HYDROCHLORIDE 0.2 MG: 0.2 INJECTION, SOLUTION INTRAMUSCULAR; INTRAVENOUS; SUBCUTANEOUS at 16:15

## 2024-05-14 RX ADMIN — HYDROMORPHONE HYDROCHLORIDE 0.2 MG: 0.2 INJECTION, SOLUTION INTRAMUSCULAR; INTRAVENOUS; SUBCUTANEOUS at 20:37

## 2024-05-14 RX ADMIN — HYDROMORPHONE HYDROCHLORIDE 0.4 MG: 1 INJECTION, SOLUTION INTRAMUSCULAR; INTRAVENOUS; SUBCUTANEOUS at 13:48

## 2024-05-14 ASSESSMENT — COGNITIVE AND FUNCTIONAL STATUS - GENERAL
HELP NEEDED FOR BATHING: TOTAL
PERSONAL GROOMING: TOTAL
DRESSING REGULAR LOWER BODY CLOTHING: TOTAL
MOVING FROM LYING ON BACK TO SITTING ON SIDE OF FLAT BED WITH BEDRAILS: A LOT
DRESSING REGULAR UPPER BODY CLOTHING: TOTAL
STANDING UP FROM CHAIR USING ARMS: TOTAL
EATING MEALS: TOTAL
TOILETING: TOTAL
TURNING FROM BACK TO SIDE WHILE IN FLAT BAD: TOTAL
MOVING TO AND FROM BED TO CHAIR: TOTAL
CLIMB 3 TO 5 STEPS WITH RAILING: TOTAL
WALKING IN HOSPITAL ROOM: TOTAL
MOBILITY SCORE: 7
DAILY ACTIVITIY SCORE: 6

## 2024-05-14 ASSESSMENT — PAIN - FUNCTIONAL ASSESSMENT
PAIN_FUNCTIONAL_ASSESSMENT: 0-10
PAIN_FUNCTIONAL_ASSESSMENT: 0-10

## 2024-05-14 NOTE — PROGRESS NOTES
RN Hospice Note    Edward Johnson is a Hospice Patient.   Hospice terminal diagnosis: multi system organ failure  Physician: Corin Sutherland NP  Visit type: GIP visit     Comments/recommendations: Pt easily arousable but with minimal verbal communication.  He denied pain but was unable to rate his dyspnea.  Pulse ox 91% and continued use of Dilaudid 0.2 mg IV being given q4hrs. Ativan used once in past 24 hours and would encourage use of Ativan or Haldol for agitation.  Pt is not eating or drinking and with decreased urinary output.  Spoke to dgt Ermelinda, who had a roomful of friends and offered hospice house today.  She said she does not want to move or change anything until after her sister arrives from out of town this evening. She said she would be willing to have pt go to hospice house tomorrow and said [place] would be closest for her.  Faxed medical records to [facility] and pt placed on bed list.     Discharge Planning:  [facility]     The following is to be completed:  Discharge order: will be needed  State DNR signed by MD: [facility]  Nursing facility referral/transfer form: na  Medication reconciliation: na  PAS/RR or convalescent stay form:  na  Prescriptions for al narcotics/new medications: na  Transportation: will need to be set up   Other: please call with time of death.   [phone]     Plan of care reviewed with patient/ family members   Plan of care reviewed with hospital staff members: Tomasa ROCHA, Vicki ABDUL and Corin Sutherland NP       Please notify [facility] of any changes in condition. Thank you.  Office:  [phone] (8 am-6:30 pm M-F and 8 am-4:30 pm weekends and holidays)              [phone] (6:30 pm-8 am M-F and 4:30 pm-8 am weekends and holidays)     Kassi Morris RN

## 2024-05-14 NOTE — CARE PLAN
The clinical goals for the shift include patient will have pain less than 2/10 this shift    Problem: Terminal Illness  Goal: Patient's needs shall be met in a safe and nurturing environment  Outcome: Progressing     Problem: Communication Thought Process  Goal: Effective communication in a Hospice environment  Outcome: Progressing  Goal: Participate in active listening and/or observation of patient's non-verbal communication  Outcome: Progressing     Problem: Emotional Distress  Goal: Patient will exhibit decreased symptoms of distress  Outcome: Progressing  Goal: Verbalize understanding of the anxiety management plan  Outcome: Progressing     Problem: Psychosocial Status  Goal: Communicate their psychosocial needs to the hospice team  Outcome: Progressing  Goal: [place] with care giving, psychosocial, and end of life issues  Outcome: Progressing  Goal: Communicate and/or document their end of life wishes  Outcome: Progressing     Problem: Spiritual Issues  Goal: Satisfaction with the level/quality of spiritual care will be expressed  Outcome: Progressing  Goal: Personal sense of spiritual peace will be experienced  Outcome: Progressing  Goal: An opportunity to address specific spiritual issues will be provided  Outcome: Progressing     Problem: Anticipatory Grieving  Goal: Patient and family will better understand the grief process  Outcome: Progressing  Goal: Patient and family will accept and/or tolerate other's individual grief responses  Outcome: Progressing  Goal: Family will be supportive of the patient and each other  Outcome: Progressing  Goal: Patient and family will progress toward acceptance of death  Outcome: Progressing     Problem: Imminent Death  Goal: Patient and family will better understand the grief process  Outcome: Progressing  Goal: Patient and family will accept and/or tolerate other's individual grief responses  Outcome: Progressing  Goal: Family will be supportive of the patient and each  other  Outcome: Progressing  Goal: Patient and family will progress toward acceptance of death  Outcome: Progressing     Problem: Respiratory Status  Goal: Patient will report or be observed by nurse/caregiver to have decreased or relieved dyspnea  Outcome: Progressing  Goal: Family will verbalize an understanding of dyspnea management  Outcome: Progressing     Problem: Pain/Physical Discomfort  Goal: Patient's pain/physical discomfort will be reduced to the level of patient's stated goal  Outcome: Progressing  Goal: Patient/Family/Caregiver will verbalize understanding of the pain management plan  Outcome: Progressing     Problem: Nutritional Status  Goal: Patient will be in control of what, how much, and when to eat  Outcome: Progressing  Goal: Mucous membranes will remain intact, moist, and clean of excessive secretions  Outcome: Progressing  Goal: Associated symptoms of anorexia will be managed  Outcome: Progressing  Goal: Family/Caregiver will verbalize understanding of anorexia in the dying patient and demonstrate comfort with the plan of care  Outcome: Progressing     Problem: Sleep  Goal: Patient will demonstrate adequate sleep/rest pattern  Outcome: Progressing     Problem: Elimination  Goal: Absence of diarrhea, constipation, and skin breakdown  Outcome: Progressing

## 2024-05-14 NOTE — PROGRESS NOTES
PALLIATIVE CARE SOCIAL WORK NOTE     Pt flipped to GIP status with [facility] yesterday. R RN saw him this afternoon. Offered bed at [facility], but family requesting that he not move today as other family members are on their way to town and coming directly to the hospital. Agreeable to [facility] transfer tomorrow if appropriate. He has been placed on the waiting list for a bed at [facility].     ,TMR

## 2024-05-14 NOTE — H&P
Edward Johnson is a 89 y.o. male   HPI   Patient with a past medical history of CVA with right-sided hemiparesis hypertension dyslipidemia was admitted to the hospital after an accidental fall and blood loss anemia brought on by right-sided hematoma  Patient's stay was complicated by congestive heart failure from fluid resuscitation  Was given Lasix with improvement but then his condition declined again and he became tachypneic tachycardic and an needed high flow oxygen  We obtained a D-dimer to rule out blood clots and that came back positive  Patient's family wanted the patient to be DNR and wanted hospice consulted  Hospice consult was obtained and he was transitioned to [facility]    Past Medical History  Past Medical History:   Diagnosis Date    Personal history of diseases of the blood and blood-forming organs and certain disorders involving the immune mechanism     History of hemorrhagic diathesis    Personal history of other diseases of the circulatory system     History of cardiac disorder    Personal history of other diseases of the circulatory system     History of hypertension    Personal history of other diseases of the musculoskeletal system and connective tissue     History of arthritis       Surgical History  Past Surgical History:   Procedure Laterality Date    CHOLECYSTECTOMY  09/28/2016    Cholecystectomy    CORONARY ARTERY BYPASS GRAFT  09/28/2016    CABG    MR HEAD ANGIO WO IV CONTRAST  8/5/2013    MR HEAD ANGIO WO IV CONTRAST 8/5/2013 [facility] CLINICAL LEGACY    MR PELVIS ANGIO WO IV CONTRAST  9/9/2016    MR PELVIS ANGIO WO IV CONTRAST 9/9/2016 [facility] [place] [facility]CILLARY LEGACY    OTHER SURGICAL HISTORY  09/28/2016    Endarterectomy Common Carotid    OTHER SURGICAL HISTORY  04/14/2017    Aortic Valve Replacement Transcatheter Percutaneous Femoral Artery Approach        Social History  He reports that he has never smoked. He has never used smokeless tobacco. He reports that he does not drink alcohol and does not use  drugs.    Family History  Family History   Problem Relation Name Age of Onset    Coronary artery disease Mother          Allergies  Patient has no known allergies.    Review of Systems     Short of breath    Vitals:    05/14/24 0816   BP: 92/59   Pulse: 96   Resp: (!) 30   Temp: 36.7 °C (98.1 °F)   SpO2: 91%        Scheduled medications  [Held by provider] amLODIPine, 10 mg, oral, Daily  aspirin, 81 mg, oral, Daily  famotidine, 10 mg, oral, Daily  HYDROmorphone, 0.2 mg, intravenous, q4h  polyethylene glycol, 17 g, oral, Daily  simvastatin, 40 mg, oral, Nightly      Continuous medications     PRN medications  PRN medications: acetaminophen, albuterol, glycopyrrolate, haloperidol lactate, HYDROmorphone, HYDROmorphone, HYDROmorphone, LORazepam, ondansetron    Results from last 7 days   Lab Units 05/13/24  0854 05/12/24  0641 05/11/24  0640   WBC AUTO x10*3/uL 13.8* 13.1* 7.9   HEMOGLOBIN g/dL 7.5* 7.0* 7.0*   HEMATOCRIT % 26.3* 21.9* 21.7*   PLATELETS AUTO x10*3/uL 163 162 145*     Results from last 7 days   Lab Units 05/13/24  0854 05/12/24  0641 05/11/24  0640 05/10/24  1440   SODIUM mmol/L 136 136 139 138   POTASSIUM mmol/L 4.8 4.7 4.8 4.4   CHLORIDE mmol/L 105 106 110* 108*   CO2 mmol/L 12* 20* 21 20*   BUN mg/dL 67* 61* 60* 60*   CREATININE mg/dL 4.00* 3.46* 3.27* 3.13*   CALCIUM mg/dL 8.5* 8.8 8.3* 8.9   PROTEIN TOTAL g/dL  --   --   --  6.0*   BILIRUBIN TOTAL mg/dL  --   --   --  0.4   ALK PHOS U/L  --   --   --  66   ALT U/L  --   --   --  6*   AST U/L  --   --   --  12   GLUCOSE mg/dL 227* 121* 116* 158*            No orders to display       Physical Exam     Constitutional   General appearance: Alert  Eyes   Inspection of eyes: Sclera and conjunctiva were normal.    Pupil exam: Pupils were equal in size. Extraocular movements were intact.   Pulmonary   Respiratory assessment: Tachypnea  Clear to auscultation  Cardiovascular   Auscultation of heart: Apical pulse normal, heart rate and rhythm normal, normal S1  and S2, no murmurs and no pericardial rub.    Exam for edema: Trace edema  Abdomen   Abdominal Exam: No bruits, normal bowel sounds, soft, non-tender, no abdominal mass palpated.    Liver and Spleen exam: No hepato-splenomegaly.   Musculoskeletal   Hematoma over right hip  Skin   Skin inspection: Normal skin color and pigmentation, normal skin turgor and no visible rash.   Neurologic   Right-sided weakness  Psychiatric   Orientation: Oriented to person, place, and time.    Mood and affect: Normal.       Assessment/Plan      #Acute respiratory failure with hypoxia  Suspect combination of possible pulmonary embolism with elevated D-dimer  Although imaging not obtained  #Blood loss anemia  Patient is [place]  Comfort care as per hospice

## 2024-05-14 NOTE — DISCHARGE SUMMARY
Discharge Diagnosis  Hematoma of right hip, initial encounter    Issues Requiring Follow-Up  Admit to [facility] under hospice care    Test Results Pending At Discharge  Pending Labs       No current pending labs.            Hospital Course  Admitted for blood loss anemia s/p fall  Complicated by acute respiratory failure with hypoxia  Family transition to hospice    Pertinent Physical Exam At Time of Discharge  Physical Exam    Home Medications     Medication List      ASK your doctor about these medications     amLODIPine 10 mg tablet; Commonly known as: Norvasc; Take 1 tablet (10   mg) by mouth once daily.   aspirin 81 mg EC tablet   cholecalciferol 50 mcg (2,000 unit) capsule; Commonly known as: Vitamin   D-3   coenzyme Q10-vitamin E 100-5 mg-unit capsule   cyanocobalamin 1,000 mcg tablet; Commonly known as: Vitamin B-12   famotidine 20 mg tablet; Commonly known as: Pepcid   metoprolol succinate XL 50 mg 24 hr tablet; Commonly known as:   Toprol-XL; Take 1 tablet (50 mg) by mouth once daily.   Miralax 17 gram/dose powder; Generic drug: polyethylene glycol   simvastatin 40 mg tablet; Commonly known as: Zocor; TAKE 1 TABLET BY   MOUTH ONCE  DAILY AS DIRECTED       Outpatient Follow-Up  Future Appointments   Date Time Provider Department Center   6/27/2024  1:30 PM Reese Nielsen MD [id number]       Nas Ash MD

## 2024-05-15 VITALS
SYSTOLIC BLOOD PRESSURE: 77 MMHG | DIASTOLIC BLOOD PRESSURE: 50 MMHG | RESPIRATION RATE: 30 BRPM | OXYGEN SATURATION: 88 % | TEMPERATURE: 97.2 F | HEART RATE: 85 BPM

## 2024-05-15 PROCEDURE — 2500000004 HC RX 250 GENERAL PHARMACY W/ HCPCS (ALT 636 FOR OP/ED): Performed by: NURSE PRACTITIONER

## 2024-05-15 PROCEDURE — 99238 HOSP IP/OBS DSCHRG MGMT 30/<: CPT | Performed by: INTERNAL MEDICINE

## 2024-05-15 RX ADMIN — HYDROMORPHONE HYDROCHLORIDE 0.2 MG: 0.2 INJECTION, SOLUTION INTRAMUSCULAR; INTRAVENOUS; SUBCUTANEOUS at 04:01

## 2024-05-15 RX ADMIN — HYDROMORPHONE HYDROCHLORIDE 0.2 MG: 0.2 INJECTION, SOLUTION INTRAMUSCULAR; INTRAVENOUS; SUBCUTANEOUS at 08:07

## 2024-05-15 RX ADMIN — HYDROMORPHONE HYDROCHLORIDE 0.2 MG: 0.2 INJECTION, SOLUTION INTRAMUSCULAR; INTRAVENOUS; SUBCUTANEOUS at 09:56

## 2024-05-15 RX ADMIN — GLYCOPYRROLATE 0.2 MG: 0.2 INJECTION, SOLUTION INTRAMUSCULAR; INTRAVITREAL at 08:07

## 2024-05-15 RX ADMIN — HYDROMORPHONE HYDROCHLORIDE 0.2 MG: 0.2 INJECTION, SOLUTION INTRAMUSCULAR; INTRAVENOUS; SUBCUTANEOUS at 00:12

## 2024-05-15 ASSESSMENT — RESPIRATORY DISTRESS OBSERVATION SCALE (RDOS)
GRUNTING AT END OF EXPIRATION: 0 - NONE
INVOLUNTARY NASAL FLARING: 0 - NONE
RDOS TOTAL SCORE: 3
PARADOXICAL BREATHING PATTERN: 0 - NONE
LOOK OF FEAR: 0 - NONE
HEART RATE PER MINUTE: 0 - <90 BEATS
ACCESSORY MUSCLE RISE IN CLAVICLE DURING INSPIRATION: 1 - SLIGHT RISE
RESTLESS NONPURPOSEFUL MOVEMENTS: 0 - NONE
RESPIRATORY RATE PER MINUTE: 2 - >30 BREATHS

## 2024-05-15 NOTE — SIGNIFICANT EVENT
Death Note Documentation    Time of death: 11:56am    I was asked to come pronounce the patient. There were no heart tones or breath sounds auscultated, no carotid pulse or radial pulse palpated, no ocular reflex present. Family was present at bedside.     Please send death certificate to attending physician Dr. Ash.    Blu Castillo, DO  Hospital Medicine

## 2024-05-15 NOTE — CARE PLAN
The patient's goals for the shift include      The clinical goals for the shift include patient will have pain less than 2/10 this shift    Over the shift, the patient did not make progress toward the following goals. Barriers to progression include ***. Recommendations to address these barriers include ***.    Problem: Terminal Illness  Goal: Patient's needs shall be met in a safe and nurturing environment  Outcome: Not Progressing     Problem: Communication Thought Process  Goal: Effective communication in a Hospice environment  Outcome: Not Progressing  Goal: Participate in active listening and/or observation of patient's non-verbal communication  Outcome: Not Progressing     Problem: Emotional Distress  Goal: Patient will exhibit decreased symptoms of distress  Outcome: Not Progressing  Goal: Verbalize understanding of the anxiety management plan  Outcome: Not Progressing     Problem: Psychosocial Status  Goal: Communicate their psychosocial needs to the hospice team  Outcome: Not Progressing  Goal: [place] with care giving, psychosocial, and end of life issues  Outcome: Not Progressing  Goal: Communicate and/or document their end of life wishes  Outcome: Not Progressing     Problem: Spiritual Issues  Goal: Satisfaction with the level/quality of spiritual care will be expressed  Outcome: Not Progressing  Goal: Personal sense of spiritual peace will be experienced  Outcome: Not Progressing  Goal: An opportunity to address specific spiritual issues will be provided  Outcome: Not Progressing     Problem: Anticipatory Grieving  Goal: Patient and family will better understand the grief process  Outcome: Not Progressing  Goal: Patient and family will accept and/or tolerate other's individual grief responses  Outcome: Not Progressing  Goal: Family will be supportive of the patient and each other  Outcome: Not Progressing  Goal: Patient and family will progress toward acceptance of death  Outcome: Not Progressing      Problem: Imminent Death  Goal: Patient and family will better understand the grief process  Outcome: Not Progressing  Goal: Patient and family will accept and/or tolerate other's individual grief responses  Outcome: Not Progressing  Goal: Family will be supportive of the patient and each other  Outcome: Not Progressing  Goal: Patient and family will progress toward acceptance of death  Outcome: Not Progressing     Problem: Respiratory Status  Goal: Patient will report or be observed by nurse/caregiver to have decreased or relieved dyspnea  Outcome: Not Progressing  Goal: Family will verbalize an understanding of dyspnea management  Outcome: Not Progressing     Problem: Pain/Physical Discomfort  Goal: Patient's pain/physical discomfort will be reduced to the level of patient's stated goal  Outcome: Not Progressing  Goal: Patient/Family/Caregiver will verbalize understanding of the pain management plan  Outcome: Not Progressing     Problem: Nutritional Status  Goal: Patient will be in control of what, how much, and when to eat  Outcome: Not Progressing  Goal: Mucous membranes will remain intact, moist, and clean of excessive secretions  Outcome: Not Progressing  Goal: Associated symptoms of anorexia will be managed  Outcome: Not Progressing  Goal: Family/Caregiver will verbalize understanding of anorexia in the dying patient and demonstrate comfort with the plan of care  Outcome: Not Progressing     Problem: Sleep  Goal: Patient will demonstrate adequate sleep/rest pattern  Outcome: Not Progressing     Problem: Elimination  Goal: Absence of diarrhea, constipation, and skin breakdown  Outcome: Not Progressing

## 2024-05-15 NOTE — NURSING NOTE
Post-Mortem care completed. Pt will be transported down to the morgue- awaiting bed from transport

## 2024-05-15 NOTE — PROGRESS NOTES
PALLIATIVE CARE SOCIAL WORK NOTE     [facility] RN Jo Ann reached out to pt's daughter this morning offering a bed [facility]. Family refused the transfer saying that they do not feel that he is stable for transfer secondary to change in condition from yesterday. [facility] to re-assess later today.     TOY Yusuf     Addendum: Pt  - time of death 11:56am.

## 2024-05-17 NOTE — DISCHARGE SUMMARY
Discharge Diagnosis  Acute respiratory failure    Issues Requiring Follow-Up    none    Test Results Pending At Discharge  Pending Labs       No current pending labs.            Hospital Course   Patient with a past medical history of CVA with right-sided hemiparesis hypertension dyslipidemia was admitted to the hospital after an accidental fall and blood loss anemia brought on by right-sided hematoma  Patient's stay was complicated by congestive heart failure from fluid resuscitation  Was given Lasix with improvement but then his condition declined again and he became tachypneic tachycardic and an needed high flow oxygen  We obtained a D-dimer to rule out blood clots and that came back positive  Patient's family wanted the patient to be DNR and wanted hospice consulted  Hospice consult was obtained and he was transitioned to [facility]  Patient passed away peacefully on 5/15/2024 at 11:56 AM  Family informed and body released to  home    Pertinent Physical Exam At Time of Discharge  Physical Exam    Home Medications     Medication List      ASK your doctor about these medications     aspirin 81 mg EC tablet   cholecalciferol 50 mcg (2,000 unit) capsule; Commonly known as: Vitamin   D-3   coenzyme Q10-vitamin E 100-5 mg-unit capsule   cyanocobalamin 1,000 mcg tablet; Commonly known as: Vitamin B-12   famotidine 20 mg tablet; Commonly known as: Pepcid   Miralax 17 gram/dose powder; Generic drug: polyethylene glycol       Outpatient Follow-Up  No future appointments.    Nas Ash MD

## 2024-06-27 ENCOUNTER — APPOINTMENT (OUTPATIENT)
Dept: PRIMARY CARE | Facility: CLINIC | Age: 89
End: 2024-06-27
Payer: OTHER MISCELLANEOUS